# Patient Record
Sex: FEMALE | Race: WHITE | NOT HISPANIC OR LATINO | Employment: FULL TIME | URBAN - METROPOLITAN AREA
[De-identification: names, ages, dates, MRNs, and addresses within clinical notes are randomized per-mention and may not be internally consistent; named-entity substitution may affect disease eponyms.]

---

## 2021-01-01 ENCOUNTER — IMMUNIZATION (OUTPATIENT)
Dept: OBSTETRICS AND GYNECOLOGY | Facility: CLINIC | Age: 46
End: 2021-01-01
Payer: COMMERCIAL

## 2021-01-01 ENCOUNTER — IMMUNIZATION (OUTPATIENT)
Dept: PRIMARY CARE CLINIC | Facility: CLINIC | Age: 46
End: 2021-01-01
Payer: OTHER GOVERNMENT

## 2021-01-01 DIAGNOSIS — Z23 NEED FOR VACCINATION: Primary | ICD-10-CM

## 2021-01-01 PROCEDURE — 91300 COVID-19, MRNA, LNP-S, PF, 30 MCG/0.3 ML DOSE VACCINE: CPT | Mod: S$GLB,,, | Performed by: FAMILY MEDICINE

## 2021-01-01 PROCEDURE — 0002A COVID-19, MRNA, LNP-S, PF, 30 MCG/0.3 ML DOSE VACCINE: ICD-10-PCS | Mod: CV19,S$GLB,, | Performed by: FAMILY MEDICINE

## 2021-01-01 PROCEDURE — 91300 COVID-19, MRNA, LNP-S, PF, 30 MCG/0.3 ML DOSE VACCINE: CPT | Mod: PBBFAC | Performed by: INTERNAL MEDICINE

## 2021-01-01 PROCEDURE — 91300 COVID-19, MRNA, LNP-S, PF, 30 MCG/0.3 ML DOSE VACCINE: ICD-10-PCS | Mod: S$GLB,,, | Performed by: FAMILY MEDICINE

## 2021-01-01 PROCEDURE — 0002A COVID-19, MRNA, LNP-S, PF, 30 MCG/0.3 ML DOSE VACCINE: CPT | Mod: CV19,S$GLB,, | Performed by: FAMILY MEDICINE

## 2021-01-01 PROCEDURE — 0003A COVID-19, MRNA, LNP-S, PF, 30 MCG/0.3 ML DOSE VACCINE: CPT | Mod: CV19,PBBFAC | Performed by: INTERNAL MEDICINE

## 2021-03-23 ENCOUNTER — IMMUNIZATION (OUTPATIENT)
Dept: OBSTETRICS AND GYNECOLOGY | Facility: CLINIC | Age: 46
End: 2021-03-23
Payer: COMMERCIAL

## 2021-03-23 DIAGNOSIS — Z23 NEED FOR VACCINATION: Primary | ICD-10-CM

## 2021-03-23 PROCEDURE — 91300 COVID-19, MRNA, LNP-S, PF, 30 MCG/0.3 ML DOSE VACCINE: CPT | Mod: PBBFAC | Performed by: FAMILY MEDICINE

## 2022-01-01 ENCOUNTER — HOSPITAL ENCOUNTER (INPATIENT)
Facility: HOSPITAL | Age: 47
LOS: 1 days | DRG: 064 | End: 2022-04-09
Attending: EMERGENCY MEDICINE | Admitting: PSYCHIATRY & NEUROLOGY

## 2022-01-01 VITALS
OXYGEN SATURATION: 99 % | TEMPERATURE: 100 F | HEIGHT: 64 IN | WEIGHT: 149.06 LBS | BODY MASS INDEX: 25.45 KG/M2 | SYSTOLIC BLOOD PRESSURE: 161 MMHG | DIASTOLIC BLOOD PRESSURE: 96 MMHG

## 2022-01-01 DIAGNOSIS — D62 ACUTE BLOOD LOSS ANEMIA: ICD-10-CM

## 2022-01-01 DIAGNOSIS — S06.310S: ICD-10-CM

## 2022-01-01 DIAGNOSIS — D69.6 THROMBOCYTOPENIA: ICD-10-CM

## 2022-01-01 DIAGNOSIS — G93.6 CYTOTOXIC CEREBRAL EDEMA: ICD-10-CM

## 2022-01-01 DIAGNOSIS — N92.0 MENORRHAGIA WITH REGULAR CYCLE: ICD-10-CM

## 2022-01-01 DIAGNOSIS — G91.1 OBSTRUCTIVE HYDROCEPHALUS: ICD-10-CM

## 2022-01-01 DIAGNOSIS — S06.33AA INTRAPARENCHYMAL HEMATOMA OF BRAIN: ICD-10-CM

## 2022-01-01 DIAGNOSIS — I62.9 SPONTANEOUS INTRACRANIAL HEMORRHAGE: Primary | ICD-10-CM

## 2022-01-01 DIAGNOSIS — C92.00 ACUTE MYELOID LEUKEMIA NOT HAVING ACHIEVED REMISSION: ICD-10-CM

## 2022-01-01 DIAGNOSIS — S06.310A INTRAPARENCHYMAL HEMATOMA OF BRAIN, RIGHT, WITHOUT LOSS OF CONSCIOUSNESS, INITIAL ENCOUNTER: ICD-10-CM

## 2022-01-01 DIAGNOSIS — I61.1 NONTRAUMATIC CORTICAL HEMORRHAGE OF RIGHT CEREBRAL HEMISPHERE: ICD-10-CM

## 2022-01-01 DIAGNOSIS — R78.81 BACTEREMIA: ICD-10-CM

## 2022-01-01 DIAGNOSIS — E78.1 HYPERTRIGLYCERIDEMIA: ICD-10-CM

## 2022-01-01 DIAGNOSIS — I61.9 ICH (INTRACEREBRAL HEMORRHAGE): ICD-10-CM

## 2022-01-01 DIAGNOSIS — R74.01 TRANSAMINITIS: ICD-10-CM

## 2022-01-01 DIAGNOSIS — I61.5 IVH (INTRAVENTRICULAR HEMORRHAGE): ICD-10-CM

## 2022-01-01 DIAGNOSIS — I61.5 INTRAVENTRICULAR HEMORRHAGE: ICD-10-CM

## 2022-01-01 DIAGNOSIS — D72.829 LEUKOCYTOSIS, UNSPECIFIED TYPE: ICD-10-CM

## 2022-01-01 DIAGNOSIS — Z53.1 BLOOD TRANSFUSION DECLINED BECAUSE PATIENT IS JEHOVAH'S WITNESS: ICD-10-CM

## 2022-01-01 DIAGNOSIS — G93.5 BRAIN HERNIATION: ICD-10-CM

## 2022-01-01 LAB
ABO + RH BLD: NORMAL
ALBUMIN SERPL BCP-MCNC: 3.8 G/DL (ref 3.5–5.2)
ALBUMIN SERPL BCP-MCNC: 3.9 G/DL (ref 3.5–5.2)
ALP SERPL-CCNC: 101 U/L (ref 55–135)
ALP SERPL-CCNC: 112 U/L (ref 55–135)
ALT SERPL W/O P-5'-P-CCNC: 113 U/L (ref 10–44)
ALT SERPL W/O P-5'-P-CCNC: 116 U/L (ref 10–44)
ANION GAP SERPL CALC-SCNC: 14 MMOL/L (ref 8–16)
ANION GAP SERPL CALC-SCNC: 17 MMOL/L (ref 8–16)
ANISOCYTOSIS BLD QL SMEAR: SLIGHT
ANISOCYTOSIS BLD QL SMEAR: SLIGHT
APTT BLDCRRT: 23.9 SEC (ref 21–32)
AST SERPL-CCNC: 79 U/L (ref 10–40)
AST SERPL-CCNC: 99 U/L (ref 10–40)
BACTERIA #/AREA URNS AUTO: ABNORMAL /HPF
BASOPHILS # BLD AUTO: ABNORMAL K/UL (ref 0–0.2)
BASOPHILS NFR BLD: 0 % (ref 0–1.9)
BASOPHILS NFR BLD: 0 % (ref 0–1.9)
BILIRUB SERPL-MCNC: 0.5 MG/DL (ref 0.1–1)
BILIRUB SERPL-MCNC: 0.6 MG/DL (ref 0.1–1)
BILIRUB UR QL STRIP: NEGATIVE
BLD GP AB SCN CELLS X3 SERPL QL: NORMAL
BUN SERPL-MCNC: 8 MG/DL (ref 6–20)
BUN SERPL-MCNC: 9 MG/DL (ref 6–20)
BURR CELLS BLD QL SMEAR: ABNORMAL
CALCIUM SERPL-MCNC: 9.1 MG/DL (ref 8.7–10.5)
CALCIUM SERPL-MCNC: 9.6 MG/DL (ref 8.7–10.5)
CHLORIDE SERPL-SCNC: 104 MMOL/L (ref 95–110)
CHLORIDE SERPL-SCNC: 108 MMOL/L (ref 95–110)
CHOLEST SERPL-MCNC: 173 MG/DL (ref 120–199)
CHOLEST/HDLC SERPL: 4.4 {RATIO} (ref 2–5)
CLARITY UR REFRACT.AUTO: ABNORMAL
CO2 SERPL-SCNC: 20 MMOL/L (ref 23–29)
CO2 SERPL-SCNC: 24 MMOL/L (ref 23–29)
COLOR UR AUTO: ABNORMAL
CREAT SERPL-MCNC: 0.9 MG/DL (ref 0.5–1.4)
CREAT SERPL-MCNC: 1 MG/DL (ref 0.5–1.4)
CTP QC/QA: YES
DIFFERENTIAL METHOD: ABNORMAL
DIFFERENTIAL METHOD: ABNORMAL
EOSINOPHIL # BLD AUTO: ABNORMAL K/UL (ref 0–0.5)
EOSINOPHIL NFR BLD: 0 % (ref 0–8)
EOSINOPHIL NFR BLD: 0 % (ref 0–8)
ERYTHROCYTE [DISTWIDTH] IN BLOOD BY AUTOMATED COUNT: 15.7 % (ref 11.5–14.5)
ERYTHROCYTE [DISTWIDTH] IN BLOOD BY AUTOMATED COUNT: 16.2 % (ref 11.5–14.5)
EST. GFR  (AFRICAN AMERICAN): >60 ML/MIN/1.73 M^2
EST. GFR  (AFRICAN AMERICAN): >60 ML/MIN/1.73 M^2
EST. GFR  (NON AFRICAN AMERICAN): >60 ML/MIN/1.73 M^2
EST. GFR  (NON AFRICAN AMERICAN): >60 ML/MIN/1.73 M^2
GLUCOSE SERPL-MCNC: 105 MG/DL (ref 70–110)
GLUCOSE SERPL-MCNC: 167 MG/DL (ref 70–110)
GLUCOSE UR QL STRIP: NEGATIVE
HCT VFR BLD AUTO: 29.5 % (ref 37–48.5)
HCT VFR BLD AUTO: 30.4 % (ref 37–48.5)
HDLC SERPL-MCNC: 39 MG/DL (ref 40–75)
HDLC SERPL: 22.5 % (ref 20–50)
HGB BLD-MCNC: 9.4 G/DL (ref 12–16)
HGB BLD-MCNC: 9.7 G/DL (ref 12–16)
HGB UR QL STRIP: ABNORMAL
HYALINE CASTS UR QL AUTO: 0 /LPF
IMM GRANULOCYTES # BLD AUTO: ABNORMAL K/UL (ref 0–0.04)
IMM GRANULOCYTES # BLD AUTO: ABNORMAL K/UL (ref 0–0.04)
IMM GRANULOCYTES NFR BLD AUTO: ABNORMAL % (ref 0–0.5)
IMM GRANULOCYTES NFR BLD AUTO: ABNORMAL % (ref 0–0.5)
INR PPP: 1.4 (ref 0.8–1.2)
KETONES UR QL STRIP: ABNORMAL
LACTATE SERPL-SCNC: 1.1 MMOL/L (ref 0.5–2.2)
LDLC SERPL CALC-MCNC: 96.8 MG/DL (ref 63–159)
LEUKOCYTE ESTERASE UR QL STRIP: NEGATIVE
LYMPHOCYTES # BLD AUTO: ABNORMAL K/UL (ref 1–4.8)
LYMPHOCYTES NFR BLD: 1.5 % (ref 18–48)
LYMPHOCYTES NFR BLD: 2 % (ref 18–48)
MAGNESIUM SERPL-MCNC: 2.1 MG/DL (ref 1.6–2.6)
MCH RBC QN AUTO: 30.5 PG (ref 27–31)
MCH RBC QN AUTO: 30.6 PG (ref 27–31)
MCHC RBC AUTO-ENTMCNC: 31.9 G/DL (ref 32–36)
MCHC RBC AUTO-ENTMCNC: 31.9 G/DL (ref 32–36)
MCV RBC AUTO: 96 FL (ref 82–98)
MCV RBC AUTO: 96 FL (ref 82–98)
MICROSCOPIC COMMENT: ABNORMAL
MONOCYTES NFR BLD: 0.5 % (ref 4–15)
MONOCYTES NFR BLD: 0.5 % (ref 4–15)
NEUTROPHILS NFR BLD: 0.5 % (ref 38–73)
NEUTROPHILS NFR BLD: 1 % (ref 38–73)
NEUTS BAND NFR BLD MANUAL: 0.5 %
NITRITE UR QL STRIP: NEGATIVE
NONHDLC SERPL-MCNC: 134 MG/DL
NRBC BLD-RTO: 0 /100 WBC
NRBC BLD-RTO: 0 /100 WBC
OSMOLALITY SERPL: 311 MOSM/KG (ref 275–295)
PH UR STRIP: 6 [PH] (ref 5–8)
PHOSPHATE SERPL-MCNC: 3.9 MG/DL (ref 2.7–4.5)
PLATELET # BLD AUTO: 17 K/UL (ref 150–450)
PLATELET # BLD AUTO: 20 K/UL (ref 150–450)
PLATELET BLD QL SMEAR: ABNORMAL
PLATELET BLD QL SMEAR: ABNORMAL
PMV BLD AUTO: 11.1 FL (ref 9.2–12.9)
PMV BLD AUTO: 12.8 FL (ref 9.2–12.9)
POIKILOCYTOSIS BLD QL SMEAR: SLIGHT
POTASSIUM SERPL-SCNC: 3.1 MMOL/L (ref 3.5–5.1)
POTASSIUM SERPL-SCNC: 4.2 MMOL/L (ref 3.5–5.1)
PROCALCITONIN SERPL IA-MCNC: 0.32 NG/ML
PROT SERPL-MCNC: 6.8 G/DL (ref 6–8.4)
PROT SERPL-MCNC: 6.9 G/DL (ref 6–8.4)
PROT UR QL STRIP: ABNORMAL
PROTHROMBIN TIME: 13.9 SEC (ref 9–12.5)
RBC # BLD AUTO: 3.08 M/UL (ref 4–5.4)
RBC # BLD AUTO: 3.17 M/UL (ref 4–5.4)
RBC #/AREA URNS AUTO: >100 /HPF (ref 0–4)
SARS-COV-2 RDRP RESP QL NAA+PROBE: NEGATIVE
SODIUM SERPL-SCNC: 142 MMOL/L (ref 136–145)
SODIUM SERPL-SCNC: 145 MMOL/L (ref 136–145)
SP GR UR STRIP: 1.02 (ref 1–1.03)
TRIGL SERPL-MCNC: 186 MG/DL (ref 30–150)
TSH SERPL DL<=0.005 MIU/L-ACNC: 2.71 UIU/ML (ref 0.4–4)
URN SPEC COLLECT METH UR: ABNORMAL
WBC # BLD AUTO: 426.7 K/UL (ref 3.9–12.7)
WBC # BLD AUTO: 472.3 K/UL (ref 3.9–12.7)
WBC #/AREA URNS AUTO: 1 /HPF (ref 0–5)
WBC OTHER NFR BLD MANUAL: 97 %
WBC OTHER NFR BLD MANUAL: 97 %

## 2022-01-01 PROCEDURE — 63600175 PHARM REV CODE 636 W HCPCS: Performed by: STUDENT IN AN ORGANIZED HEALTH CARE EDUCATION/TRAINING PROGRAM

## 2022-01-01 PROCEDURE — 27000221 HC OXYGEN, UP TO 24 HOURS

## 2022-01-01 PROCEDURE — 83930 ASSAY OF BLOOD OSMOLALITY: CPT | Performed by: PHYSICIAN ASSISTANT

## 2022-01-01 PROCEDURE — 84100 ASSAY OF PHOSPHORUS: CPT | Performed by: PSYCHIATRY & NEUROLOGY

## 2022-01-01 PROCEDURE — 99291 PR CRITICAL CARE, E/M 30-74 MINUTES: ICD-10-PCS | Mod: ,,, | Performed by: PHYSICIAN ASSISTANT

## 2022-01-01 PROCEDURE — 99900035 HC TECH TIME PER 15 MIN (STAT)

## 2022-01-01 PROCEDURE — U0002 COVID-19 LAB TEST NON-CDC: HCPCS | Performed by: PHYSICIAN ASSISTANT

## 2022-01-01 PROCEDURE — 85007 BL SMEAR W/DIFF WBC COUNT: CPT | Performed by: PHYSICIAN ASSISTANT

## 2022-01-01 PROCEDURE — 99291 CRITICAL CARE FIRST HOUR: CPT | Mod: CS,,, | Performed by: EMERGENCY MEDICINE

## 2022-01-01 PROCEDURE — 93005 ELECTROCARDIOGRAM TRACING: CPT

## 2022-01-01 PROCEDURE — 99291 CRITICAL CARE FIRST HOUR: CPT | Mod: 25

## 2022-01-01 PROCEDURE — 97167 OT EVAL HIGH COMPLEX 60 MIN: CPT

## 2022-01-01 PROCEDURE — 86901 BLOOD TYPING SEROLOGIC RH(D): CPT | Performed by: PHYSICIAN ASSISTANT

## 2022-01-01 PROCEDURE — 93010 ELECTROCARDIOGRAM REPORT: CPT | Mod: ,,, | Performed by: INTERNAL MEDICINE

## 2022-01-01 PROCEDURE — 97112 NEUROMUSCULAR REEDUCATION: CPT

## 2022-01-01 PROCEDURE — 99291 PR CRITICAL CARE, E/M 30-74 MINUTES: ICD-10-PCS | Mod: CS,,, | Performed by: EMERGENCY MEDICINE

## 2022-01-01 PROCEDURE — 99223 PR INITIAL HOSPITAL CARE,LEVL III: ICD-10-PCS | Mod: ,,, | Performed by: NURSE PRACTITIONER

## 2022-01-01 PROCEDURE — 99238 PR HOSPITAL DISCHARGE DAY,<30 MIN: ICD-10-PCS | Mod: ,,, | Performed by: PSYCHIATRY & NEUROLOGY

## 2022-01-01 PROCEDURE — 99223 1ST HOSP IP/OBS HIGH 75: CPT | Mod: ,,, | Performed by: NURSE PRACTITIONER

## 2022-01-01 PROCEDURE — 99291 CRITICAL CARE FIRST HOUR: CPT | Mod: ,,, | Performed by: PHYSICIAN ASSISTANT

## 2022-01-01 PROCEDURE — 83735 ASSAY OF MAGNESIUM: CPT | Performed by: PSYCHIATRY & NEUROLOGY

## 2022-01-01 PROCEDURE — 80053 COMPREHEN METABOLIC PANEL: CPT | Performed by: PSYCHIATRY & NEUROLOGY

## 2022-01-01 PROCEDURE — A4217 STERILE WATER/SALINE, 500 ML: HCPCS | Performed by: PHYSICIAN ASSISTANT

## 2022-01-01 PROCEDURE — 84443 ASSAY THYROID STIM HORMONE: CPT | Performed by: PHYSICIAN ASSISTANT

## 2022-01-01 PROCEDURE — 94761 N-INVAS EAR/PLS OXIMETRY MLT: CPT

## 2022-01-01 PROCEDURE — 99238 HOSP IP/OBS DSCHRG MGMT 30/<: CPT | Mod: ,,, | Performed by: PSYCHIATRY & NEUROLOGY

## 2022-01-01 PROCEDURE — 63600175 PHARM REV CODE 636 W HCPCS: Performed by: PSYCHIATRY & NEUROLOGY

## 2022-01-01 PROCEDURE — 25000003 PHARM REV CODE 250: Performed by: PHYSICIAN ASSISTANT

## 2022-01-01 PROCEDURE — 83036 HEMOGLOBIN GLYCOSYLATED A1C: CPT | Performed by: PHYSICIAN ASSISTANT

## 2022-01-01 PROCEDURE — 85027 COMPLETE CBC AUTOMATED: CPT | Performed by: STUDENT IN AN ORGANIZED HEALTH CARE EDUCATION/TRAINING PROGRAM

## 2022-01-01 PROCEDURE — 80061 LIPID PANEL: CPT | Performed by: PHYSICIAN ASSISTANT

## 2022-01-01 PROCEDURE — 25000003 PHARM REV CODE 250

## 2022-01-01 PROCEDURE — 99285 EMERGENCY DEPT VISIT HI MDM: CPT | Mod: 25

## 2022-01-01 PROCEDURE — 63600175 PHARM REV CODE 636 W HCPCS: Performed by: PHYSICIAN ASSISTANT

## 2022-01-01 PROCEDURE — 94002 VENT MGMT INPAT INIT DAY: CPT

## 2022-01-01 PROCEDURE — 25000003 PHARM REV CODE 250: Performed by: STUDENT IN AN ORGANIZED HEALTH CARE EDUCATION/TRAINING PROGRAM

## 2022-01-01 PROCEDURE — 85027 COMPLETE CBC AUTOMATED: CPT | Performed by: PHYSICIAN ASSISTANT

## 2022-01-01 PROCEDURE — 81001 URINALYSIS AUTO W/SCOPE: CPT | Performed by: PHYSICIAN ASSISTANT

## 2022-01-01 PROCEDURE — 85730 THROMBOPLASTIN TIME PARTIAL: CPT | Performed by: PHYSICIAN ASSISTANT

## 2022-01-01 PROCEDURE — 51702 INSERT TEMP BLADDER CATH: CPT

## 2022-01-01 PROCEDURE — 85060 BLOOD SMEAR INTERPRETATION: CPT | Mod: ,,, | Performed by: PATHOLOGY

## 2022-01-01 PROCEDURE — 99900026 HC AIRWAY MAINTENANCE (STAT)

## 2022-01-01 PROCEDURE — 63600175 PHARM REV CODE 636 W HCPCS: Mod: JG | Performed by: PHYSICIAN ASSISTANT

## 2022-01-01 PROCEDURE — 25000003 PHARM REV CODE 250: Performed by: PSYCHIATRY & NEUROLOGY

## 2022-01-01 PROCEDURE — 83605 ASSAY OF LACTIC ACID: CPT | Performed by: PHYSICIAN ASSISTANT

## 2022-01-01 PROCEDURE — 96374 THER/PROPH/DIAG INJ IV PUSH: CPT

## 2022-01-01 PROCEDURE — 85007 BL SMEAR W/DIFF WBC COUNT: CPT | Performed by: STUDENT IN AN ORGANIZED HEALTH CARE EDUCATION/TRAINING PROGRAM

## 2022-01-01 PROCEDURE — 87040 BLOOD CULTURE FOR BACTERIA: CPT | Mod: 59 | Performed by: PHYSICIAN ASSISTANT

## 2022-01-01 PROCEDURE — 80053 COMPREHEN METABOLIC PANEL: CPT | Performed by: STUDENT IN AN ORGANIZED HEALTH CARE EDUCATION/TRAINING PROGRAM

## 2022-01-01 PROCEDURE — 85610 PROTHROMBIN TIME: CPT | Performed by: PHYSICIAN ASSISTANT

## 2022-01-01 PROCEDURE — 63600175 PHARM REV CODE 636 W HCPCS

## 2022-01-01 PROCEDURE — 36415 COLL VENOUS BLD VENIPUNCTURE: CPT | Performed by: PHYSICIAN ASSISTANT

## 2022-01-01 PROCEDURE — 20000000 HC ICU ROOM

## 2022-01-01 PROCEDURE — 96375 TX/PRO/DX INJ NEW DRUG ADDON: CPT

## 2022-01-01 PROCEDURE — 93010 EKG 12-LEAD: ICD-10-PCS | Mod: ,,, | Performed by: INTERNAL MEDICINE

## 2022-01-01 PROCEDURE — 85060 PATHOLOGIST REVIEW: ICD-10-PCS | Mod: ,,, | Performed by: PATHOLOGY

## 2022-01-01 PROCEDURE — 84145 PROCALCITONIN (PCT): CPT | Performed by: PHYSICIAN ASSISTANT

## 2022-01-01 RX ORDER — 3% SODIUM CHLORIDE 3 G/100ML
60 INJECTION, SOLUTION INTRAVENOUS CONTINUOUS
Status: DISCONTINUED | OUTPATIENT
Start: 2022-01-01 | End: 2022-01-01

## 2022-01-01 RX ORDER — LORAZEPAM 2 MG/ML
1 INJECTION INTRAMUSCULAR EVERY 30 MIN PRN
Status: DISCONTINUED | OUTPATIENT
Start: 2022-01-01 | End: 2022-01-01 | Stop reason: HOSPADM

## 2022-01-01 RX ORDER — SODIUM CHLORIDE 9 MG/ML
INJECTION, SOLUTION INTRAVENOUS CONTINUOUS
Status: DISCONTINUED | OUTPATIENT
Start: 2022-01-01 | End: 2022-01-01

## 2022-01-01 RX ORDER — MORPHINE SULFATE 4 MG/ML
4 INJECTION, SOLUTION INTRAMUSCULAR; INTRAVENOUS
Status: COMPLETED | OUTPATIENT
Start: 2022-01-01 | End: 2022-01-01

## 2022-01-01 RX ORDER — ROCURONIUM BROMIDE 10 MG/ML
INJECTION, SOLUTION INTRAVENOUS
Status: COMPLETED
Start: 2022-01-01 | End: 2022-01-01

## 2022-01-01 RX ORDER — ACETAMINOPHEN 650 MG/1
650 SUPPOSITORY RECTAL EVERY 6 HOURS PRN
Status: DISCONTINUED | OUTPATIENT
Start: 2022-01-01 | End: 2022-01-01

## 2022-01-01 RX ORDER — NICARDIPINE HYDROCHLORIDE 0.2 MG/ML
0-15 INJECTION INTRAVENOUS CONTINUOUS
Status: DISCONTINUED | OUTPATIENT
Start: 2022-01-01 | End: 2022-01-01

## 2022-01-01 RX ORDER — ONDANSETRON 2 MG/ML
8 INJECTION INTRAMUSCULAR; INTRAVENOUS EVERY 8 HOURS PRN
Status: DISCONTINUED | OUTPATIENT
Start: 2022-01-01 | End: 2022-01-01 | Stop reason: HOSPADM

## 2022-01-01 RX ORDER — MORPHINE SULFATE IN 0.9 % NACL 30 MG/30ML
0-10 PATIENT CONTROLLED ANALGESIA SYRINGE INTRAVENOUS CONTINUOUS
Status: DISCONTINUED | OUTPATIENT
Start: 2022-01-01 | End: 2022-01-01 | Stop reason: HOSPADM

## 2022-01-01 RX ORDER — LEVETIRACETAM 500 MG/5ML
1000 INJECTION, SOLUTION, CONCENTRATE INTRAVENOUS ONCE
Status: COMPLETED | OUTPATIENT
Start: 2022-01-01 | End: 2022-01-01

## 2022-01-01 RX ORDER — PROPOFOL 10 MG/ML
VIAL (ML) INTRAVENOUS
Status: COMPLETED
Start: 2022-01-01 | End: 2022-01-01

## 2022-01-01 RX ORDER — ONDANSETRON 2 MG/ML
INJECTION INTRAMUSCULAR; INTRAVENOUS
Status: COMPLETED
Start: 2022-01-01 | End: 2022-01-01

## 2022-01-01 RX ORDER — ETOMIDATE 2 MG/ML
INJECTION INTRAVENOUS
Status: COMPLETED
Start: 2022-01-01 | End: 2022-01-01

## 2022-01-01 RX ORDER — ONDANSETRON 2 MG/ML
4 INJECTION INTRAMUSCULAR; INTRAVENOUS EVERY 8 HOURS PRN
Status: DISCONTINUED | OUTPATIENT
Start: 2022-01-01 | End: 2022-01-01

## 2022-01-01 RX ORDER — SODIUM CHLORIDE 0.9 % (FLUSH) 0.9 %
10 SYRINGE (ML) INJECTION
Status: DISCONTINUED | OUTPATIENT
Start: 2022-01-01 | End: 2022-01-01 | Stop reason: HOSPADM

## 2022-01-01 RX ORDER — PROPOFOL 10 MG/ML
50 VIAL (ML) INTRAVENOUS ONCE
Status: COMPLETED | OUTPATIENT
Start: 2022-01-01 | End: 2022-01-01

## 2022-01-01 RX ORDER — LEVETIRACETAM 500 MG/5ML
1000 INJECTION, SOLUTION, CONCENTRATE INTRAVENOUS
Status: COMPLETED | OUTPATIENT
Start: 2022-01-01 | End: 2022-01-01

## 2022-01-01 RX ORDER — ACETAMINOPHEN 500 MG
1000 TABLET ORAL
Status: DISCONTINUED | OUTPATIENT
Start: 2022-01-01 | End: 2022-01-01

## 2022-01-01 RX ORDER — PROPOFOL 10 MG/ML
0-50 INJECTION, EMULSION INTRAVENOUS CONTINUOUS
Status: DISCONTINUED | OUTPATIENT
Start: 2022-01-01 | End: 2022-01-01

## 2022-01-01 RX ORDER — MANNITOL 20 G/100ML
50 INJECTION, SOLUTION INTRAVENOUS ONCE
Status: COMPLETED | OUTPATIENT
Start: 2022-01-01 | End: 2022-01-01

## 2022-01-01 RX ORDER — LEVETIRACETAM 500 MG/5ML
500 INJECTION, SOLUTION, CONCENTRATE INTRAVENOUS EVERY 12 HOURS
Status: DISCONTINUED | OUTPATIENT
Start: 2022-01-01 | End: 2022-01-01

## 2022-01-01 RX ORDER — LEVETIRACETAM 500 MG/5ML
500 INJECTION, SOLUTION, CONCENTRATE INTRAVENOUS
Status: DISCONTINUED | OUTPATIENT
Start: 2022-01-01 | End: 2022-01-01

## 2022-01-01 RX ORDER — ROCURONIUM BROMIDE 10 MG/ML
100 INJECTION, SOLUTION INTRAVENOUS ONCE
Status: COMPLETED | OUTPATIENT
Start: 2022-01-01 | End: 2022-01-01

## 2022-01-01 RX ADMIN — DESMOPRESSIN ACETATE 17.7 MCG: 4 INJECTION INTRAVENOUS at 10:04

## 2022-01-01 RX ADMIN — PROPOFOL 5 MCG/KG/MIN: 10 INJECTION, EMULSION INTRAVENOUS at 07:04

## 2022-01-01 RX ADMIN — ROCURONIUM BROMIDE 100 MG: 10 INJECTION, SOLUTION INTRAVENOUS at 12:04

## 2022-01-01 RX ADMIN — Medication 2 MG/HR: at 08:04

## 2022-01-01 RX ADMIN — ACETAMINOPHEN 650 MG: 650 SUPPOSITORY RECTAL at 09:04

## 2022-01-01 RX ADMIN — SODIUM CHLORIDE: 2.5 INJECTION, SOLUTION, CONCENTRATE INTRAVENOUS at 11:04

## 2022-01-01 RX ADMIN — LEVETIRACETAM 1000 MG: 100 INJECTION, SOLUTION, CONCENTRATE INTRAVENOUS at 09:04

## 2022-01-01 RX ADMIN — PROPOFOL 50 MG: 10 INJECTION, EMULSION INTRAVENOUS at 12:04

## 2022-01-01 RX ADMIN — ONDANSETRON 4 MG: 2 INJECTION INTRAMUSCULAR; INTRAVENOUS at 09:04

## 2022-01-01 RX ADMIN — GLYCOPYRROLATE 0.2 MG: 0.2 INJECTION, SOLUTION INTRAMUSCULAR; INTRAVITREAL at 08:04

## 2022-01-01 RX ADMIN — Medication 50 MG: at 12:04

## 2022-01-01 RX ADMIN — SODIUM CHLORIDE 30 ML/HR: 3 INJECTION, SOLUTION INTRAVENOUS at 04:04

## 2022-01-01 RX ADMIN — MANNITOL 50 G: 20 INJECTION, SOLUTION INTRAVENOUS at 03:04

## 2022-01-01 RX ADMIN — LEVETIRACETAM 1000 MG: 100 INJECTION, SOLUTION, CONCENTRATE INTRAVENOUS at 08:04

## 2022-01-01 RX ADMIN — SODIUM CHLORIDE: 2.5 INJECTION, SOLUTION, CONCENTRATE INTRAVENOUS at 02:04

## 2022-01-01 RX ADMIN — LORAZEPAM 1 MG: 2 INJECTION INTRAMUSCULAR; INTRAVENOUS at 09:04

## 2022-01-01 RX ADMIN — SODIUM CHLORIDE: 2.5 INJECTION, SOLUTION, CONCENTRATE INTRAVENOUS at 12:04

## 2022-01-01 RX ADMIN — SODIUM CHLORIDE: 0.9 INJECTION, SOLUTION INTRAVENOUS at 10:04

## 2022-01-01 RX ADMIN — MORPHINE SULFATE 4 MG: 4 INJECTION INTRAVENOUS at 08:04

## 2022-01-01 RX ADMIN — VANCOMYCIN HYDROCHLORIDE 1500 MG: 1.5 INJECTION, POWDER, LYOPHILIZED, FOR SOLUTION INTRAVENOUS at 04:04

## 2022-04-08 PROBLEM — G93.6 CYTOTOXIC CEREBRAL EDEMA: Status: ACTIVE | Noted: 2022-01-01

## 2022-04-08 PROBLEM — S06.33AA INTRAPARENCHYMAL HEMATOMA OF BRAIN: Status: ACTIVE | Noted: 2022-01-01

## 2022-04-08 PROBLEM — C95.90 LEUKEMIA: Status: ACTIVE | Noted: 2022-01-01

## 2022-04-09 PROBLEM — N92.0 MENORRHAGIA: Status: ACTIVE | Noted: 2022-01-01

## 2022-04-09 PROBLEM — D72.829 LEUKOCYTOSIS: Status: ACTIVE | Noted: 2022-01-01

## 2022-04-09 PROBLEM — C92.00 AML (ACUTE MYELOBLASTIC LEUKEMIA): Status: ACTIVE | Noted: 2022-01-01

## 2022-04-09 PROBLEM — R78.81 BACTEREMIA: Status: ACTIVE | Noted: 2022-01-01

## 2022-04-09 PROBLEM — G93.5 BRAIN HERNIATION: Status: ACTIVE | Noted: 2022-01-01

## 2022-04-09 PROBLEM — E78.1 HYPERTRIGLYCERIDEMIA: Status: ACTIVE | Noted: 2022-01-01

## 2022-04-09 PROBLEM — Z53.1 BLOOD TRANSFUSION DECLINED BECAUSE PATIENT IS JEHOVAH'S WITNESS: Status: ACTIVE | Noted: 2022-01-01

## 2022-04-09 PROBLEM — R74.01 TRANSAMINITIS: Status: ACTIVE | Noted: 2022-01-01

## 2022-04-09 PROBLEM — D62 ACUTE BLOOD LOSS ANEMIA: Status: ACTIVE | Noted: 2022-01-01

## 2022-04-09 PROBLEM — I61.5 IVH (INTRAVENTRICULAR HEMORRHAGE): Status: ACTIVE | Noted: 2022-01-01

## 2022-04-09 PROBLEM — D69.6 THROMBOCYTOPENIA: Status: ACTIVE | Noted: 2022-01-01

## 2022-04-09 PROBLEM — G91.1 OBSTRUCTIVE HYDROCEPHALUS: Status: ACTIVE | Noted: 2022-01-01

## 2022-04-09 NOTE — PLAN OF CARE
Psychiatric Care Plan    POC reviewed with Lori Hawkins and family at 0300. Pt verbalized understanding. Questions and concerns addressed. No acute events overnight. Pt progressing toward goals. Will continue to monitor. See below and flowsheets for full assessment and VS info.   -neuro exam monitored and addressed with provider as needed  - BP remained WDL  - CTH completed  - 3% solution started   - two bolus of 2% solutions given   - mannitol given   - blood cultures obtained  -urine cultures collected    Neuro:  [place] Coma Scale  Best Eye Response: 1-->(E1) none  Best Motor Response: 2-->(M2) extension to pain  Best Verbal Response: 1-->(V1) none  [place] Coma Scale Score: 4  Assessment Qualifiers: no eye obstruction present, patient intubated        24hr Temp:  [96.8 °F (36 °C)-101.5 °F (38.6 °C)]     CV:   Rhythm: normal sinus rhythm  BP goals:   SBP < 140  MAP > 65    Resp:   O2 Device (Oxygen Therapy): ventilator  Oxygen Concentration (%): 70    Plan: wean to extubate    GI/:     Diet/Nutrition Received: NPO  Last Bowel Movement: 04/08/22  Voiding Characteristics: external catheter    Intake/Output Summary (Last 24 hours) at 4/9/2022 0727  Last data filed at 4/9/2022 0605  Gross per 24 hour   Intake 410.32 ml   Output 320 ml   Net 90.32 ml          Labs/Accuchecks:  Recent Labs   Lab 04/09/22  0219   .70*   RBC 3.08*   HGB 9.4*   HCT 29.5*   PLT 17*      Recent Labs   Lab 04/09/22  0429      K 3.1*   CO2 20*      BUN 9   CREATININE 0.9   ALKPHOS 101   *   AST 79*   BILITOT 0.6      Recent Labs   Lab 04/08/22  2321   INR 1.4*   APTT 23.9    No results for input(s): CPK, CPKMB, TROPONINI, MB in the last 168 hours.    Electrolytes: No replacement orders  Accuchecks: none    Gtts:   morphine      niCARdipine Stopped (04/08/22 2300)    propofoL Stopped (04/09/22 0100)    sodium chloride 3% 60 mL/hr (04/09/22 0517)       LDA/Wounds:  Lines/Drains/Airways       Drain  Duration                   Urethral Catheter 04/09/22 0300 <1 day              Airway  Duration                  Airway - Non-Surgical 04/08/22 2347 Endotracheal Tube <1 day              Peripheral Intravenous Line  Duration                  Peripheral IV - Single Lumen 04/08/22 1944 18 G Left Antecubital <1 day         Peripheral IV - Single Lumen 04/08/22 2006 18 G Right Forearm <1 day                  Wounds: Yes  Wound care consulted: No   Problem: Adjustment to Illness (Stroke, Hemorrhagic)  Goal: Optimal Coping  Outcome: Ongoing, Progressing  Intervention: Support Psychosocial Response to Stroke  Flowsheets (Taken 4/9/2022 4109)  Supportive Measures: active listening utilized  Family/Support System Care: caregiver stress acknowledged

## 2022-04-09 NOTE — NURSING
MAXIMO notified of GCS of 4 and possible switch to comfort care later today. Referal number [phone]. Whitley Cotton was coordinator. Pt not a candidate for tissue donation due to history

## 2022-04-09 NOTE — SUBJECTIVE & OBJECTIVE
History reviewed. No pertinent past medical history.  Past Surgical History:   Procedure Laterality Date    excision lip cyst      benign     Family History   Problem Relation Age of Onset    Breast cancer Neg Hx     Colon cancer Neg Hx     Ovarian cancer Neg Hx      Social History     Tobacco Use    Smoking status: Never Smoker    Smokeless tobacco: Never Used     Review of patient's allergies indicates:   Allergen Reactions    Pcn [penicillins]        Medications: I have reviewed the current medication administration record.    Medications Prior to Admission   Medication Sig Dispense Refill Last Dose    levonorgestrel-ethinyl estradiol (ENPRESSE) 50-30 (6)/75-40 (5)/125-30(10) per tablet Take 1 tablet by mouth once daily. 84 tablet 4        Review of Systems   Unable to perform ROS: Mental status change   Constitutional:  Positive for fever.   HENT:  Negative for drooling.    Eyes:  Negative for photophobia and redness.   Respiratory:  Negative for cough and shortness of breath.    Gastrointestinal:  Positive for vomiting. Negative for diarrhea.   Genitourinary:  Positive for hematuria (patient currently on menstrual cycle).   Musculoskeletal:  Positive for neck pain.   Skin:  Positive for color change.   Neurological:  Positive for dizziness and headaches.   Psychiatric/Behavioral:  Negative for agitation.    Objective:     Vital Signs (Most Recent):  Temp: 96.8 °F (36 °C) (04/08/22 2234)  Pulse: 82 (04/08/22 2217)  Resp: 16 (04/08/22 2012)  BP: 112/71 (04/08/22 2216)  SpO2: 96 % (04/08/22 2217)    Vital Signs Range (Last 24H):  Temp:  [96.8 °F (36 °C)-101.5 °F (38.6 °C)]   Pulse:  [82-96]   Resp:  [16]   BP: (112-133)/(71-77)   SpO2:  [94 %-97 %]     Physical Exam  Vitals and nursing note reviewed.   Constitutional:       Appearance: She is well-developed, well-groomed and normal weight.   HENT:      Head: Normocephalic and atraumatic.      Right Ear: External ear normal.      Left Ear: External ear normal.       Nose: Nose normal.      Mouth/Throat:      Mouth: Mucous membranes are moist.   Eyes:      General: No scleral icterus.        Right eye: No discharge.         Left eye: No discharge.      Extraocular Movements: Extraocular movements intact.      Conjunctiva/sclera: Conjunctivae normal.      Pupils: Pupils are equal, round, and reactive to light.   Cardiovascular:      Rate and Rhythm: Normal rate and regular rhythm.   Pulmonary:      Effort: Pulmonary effort is normal. No respiratory distress.   Abdominal:      General: Abdomen is flat. There is no distension.      Palpations: Abdomen is soft.   Musculoskeletal:      Cervical back: Normal range of motion and neck supple.      Right lower leg: No edema.      Left lower leg: No edema.   Skin:     General: Skin is warm and dry.      Findings: Bruising present.   Neurological:      Mental Status: She is lethargic.      GCS: GCS eye subscore is 2. GCS verbal subscore is 2. GCS motor subscore is 6.      Motor: No tremor.       Neurological Exam:   LOC: obtunded  Attention Span: Good   Pupils (CN II, III): PERRL      Laboratory:  CMP:   Recent Labs   Lab 04/08/22 2013   CALCIUM 9.6   ALBUMIN 3.9   PROT 6.8      K 4.2   CO2 24      BUN 8   CREATININE 1.0   ALKPHOS 112   *   AST 99*   BILITOT 0.5     CBC:   Recent Labs   Lab 04/08/22 2013   .30*   RBC 3.17*   HGB 9.7*   HCT 30.4*   PLT 20*   MCV 96   MCH 30.6   MCHC 31.9*     Lipid Panel: No results for input(s): CHOL, LDLCALC, HDL, TRIG in the last 168 hours.  Coagulation: No results for input(s): PT, INR, APTT in the last 168 hours.  Hgb A1C: No results for input(s): HGBA1C in the last 168 hours.  TSH: No results for input(s): TSH in the last 168 hours.    Diagnostic Results:      Brain imaging:  CT 4/8/2022 Impression: Large acute intraparenchymal hematoma in the right cerebrum at the frontoparietal junction with right lateral intraventricular extension.  Small intraparenchymal hematoma  in the anterior right temporal lobe also.  Possible minimal subarachnoid hemorrhage in the basilar cisterns also.  Mild mass effect on the right with mild compression of the right lateral ventricle and mild midline shift to the left.  Neurosurgical consultation and follow-up recommended.      Vessel Imaging:  None    Cardiac Evaluation:   TTE pending

## 2022-04-09 NOTE — SUBJECTIVE & OBJECTIVE
History reviewed. No pertinent past medical history.  Past Surgical History:   Procedure Laterality Date    excision lip cyst      benign      No current facility-administered medications on file prior to encounter.     Current Outpatient Medications on File Prior to Encounter   Medication Sig Dispense Refill    levonorgestrel-ethinyl estradiol (ENPRESSE) 50-30 (6)/75-40 (5)/125-30(10) per tablet Take 1 tablet by mouth once daily. 84 tablet 4      Allergies: Pcn [penicillins]    Family History   Problem Relation Age of Onset    Breast cancer Neg Hx     Colon cancer Neg Hx     Ovarian cancer Neg Hx      Social History     Tobacco Use    Smoking status: Never Smoker    Smokeless tobacco: Never Used     Pt had just received 4mg morphine and was drowsy.  Review of Systems   Unable to perform ROS: Acuity of condition   Constitutional:  Positive for fever.   HENT:  Negative for rhinorrhea.         Bleeding gums   Respiratory:  Negative for shortness of breath and wheezing.    Cardiovascular:  Negative for chest pain and palpitations.   Gastrointestinal:  Positive for nausea and vomiting. Negative for abdominal pain, constipation and diarrhea.   Genitourinary:         Currently menstruating with menorrhagia   Musculoskeletal:  Negative for neck stiffness.   Skin:         Petechial hemorrhages and bruising throughout BLE and R hand   Neurological:  Positive for weakness, numbness and headaches. Negative for dizziness, tremors, seizures and speech difficulty.   Hematological:  Bruises/bleeds easily.   Psychiatric/Behavioral:  Negative for agitation and confusion.    Objective:     Vitals:    Temp: 98.2 °F (36.8 °C)  Pulse: 71  Rhythm: normal sinus rhythm  BP: 123/78  MAP (mmHg): 96  Resp: 19  SpO2: 99 %  Oxygen Concentration (%): 70  O2 Device (Oxygen Therapy): ventilator    Temp  Min: 96.8 °F (36 °C)  Max: 101.5 °F (38.6 °C)  Pulse  Min: 71  Max: 99  BP  Min: 112/71  Max: 133/72  MAP (mmHg)  Min: 87  Max: 99  Resp  Min: 14   Max: 21  SpO2  Min: 92 %  Max: 99 %  Oxygen Concentration (%)  Min: 70  Max: 100    04/08 0701 - 04/09 0700  In: 410.3 [I.V.:360.3]  Out: -            Physical Exam  Constitutional:       Appearance: She is ill-appearing.      Comments: Drowsy as just received 4mg morphine but alerts to light touch and voice   HENT:      Head: Normocephalic and atraumatic.      Right Ear: External ear normal.      Left Ear: External ear normal.      Nose: Nose normal. No rhinorrhea.      Mouth/Throat:      Mouth: Mucous membranes are moist.      Comments: Bleeding gums  Eyes:      General:         Right eye: No discharge.         Left eye: No discharge.      Conjunctiva/sclera: Conjunctivae normal.   Cardiovascular:      Rate and Rhythm: Normal rate and regular rhythm.      Pulses: Normal pulses.   Pulmonary:      Effort: Pulmonary effort is normal. No respiratory distress.      Breath sounds: No stridor. No wheezing.   Abdominal:      General: Abdomen is flat. There is no distension.      Palpations: Abdomen is soft. There is no mass.      Tenderness: There is no abdominal tenderness. There is no guarding.      Hernia: No hernia is present.   Musculoskeletal:         General: No swelling, tenderness or deformity.      Cervical back: Neck supple. No rigidity.   Skin:     General: Skin is warm and dry.      Findings: Bruising (BLE) present.      Comments: Petechial hemorrhaging in BLE and R hand/nails   Neurological:      Comments: Initial exam, no sedation but just received 4mg morphine:  GCS [id number]  Drowsy but alerts to light touch/voice  Oriented x 4  Follows commands on R, plegic on L  PERRL 3mm  R gaze preference overcome with L eye but not R  Minor facial asymmetry  Moves R side spont and to command AG with 5/5 strength  L side plegic  Sensation intact to light touch RUE, diminished LUE, LLE, RLE   LUE no movement to pain, LLE minimal to no movement to pain    Psychiatric:         Mood and Affect: Mood normal.     Unable to  test memory, judgment, insight, fund of knowledge, coordination, gait due to level of consciousness.    Today I personally reviewed pertinent medications, lines/drains/airways, imaging, cardiology results, laboratory results, microbiology results, notably:    CTH

## 2022-04-09 NOTE — NURSING
Pt transported to CT with cardiac monitoring. Rn assist X2 was used with RT presence for ventilator support. No acute events during transport. Pt returned to room. RN WCTM

## 2022-04-09 NOTE — SUBJECTIVE & OBJECTIVE
(Not in a hospital admission)      Review of patient's allergies indicates:   Allergen Reactions    Pcn [penicillins]        History reviewed. No pertinent past medical history.  Past Surgical History:   Procedure Laterality Date    excision lip cyst      benign     Family History    None       Tobacco Use    Smoking status: Never Smoker    Smokeless tobacco: Never Used   Substance and Sexual Activity    Alcohol use: Not on file    Drug use: Not on file    Sexual activity: Not on file     Review of Systems  Objective:     Weight: 59 kg (130 lb)  Body mass index is 22.31 kg/m².  Vital Signs (Most Recent):  Temp: (!) 100.9 °F (38.3 °C) (04/08/22 1922)  Pulse: 96 (04/08/22 2012)  Resp: 16 (04/08/22 2012)  BP: 125/74 (04/08/22 2002)  SpO2: 97 % (04/08/22 2012)   Vital Signs (24h Range):  Temp:  [100.9 °F (38.3 °C)] 100.9 °F (38.3 °C)  Pulse:  [92-96] 96  Resp:  [16] 16  SpO2:  [96 %-97 %] 97 %  BP: (121-133)/(72-76) 125/74                   Physical Exam  Neurosurgery Physical Exam    Left alfredo plegic   follows on right. Aox2   PERRL    right gaze deviation.  Sees two fingers in left quadrant.       Significant Labs:  Recent Labs   Lab 04/08/22 2013         K 4.2      CO2 24   BUN 8   CREATININE 1.0   CALCIUM 9.6     Recent Labs   Lab 04/08/22 2013   .30*   HGB 9.7*   HCT 30.4*   PLT 20*     No results for input(s): LABPT, INR, APTT in the last 48 hours.  Microbiology Results (last 7 days)       ** No results found for the last 168 hours. **          All pertinent labs from the last 24 hours have been reviewed.    Significant Diagnostics:  CT: No results found in the last 24 hours.  CTH with IPH near motor strip and IVH but no hydro.  No significant MLS

## 2022-04-09 NOTE — PLAN OF CARE
OT evaluation initiated.  Problem: Occupational Therapy  Goal: Occupational Therapy Goal  Description: Goals set 4/9 to be addressed for 14 days with expiration date, 4/23:  Patient will increase functional independence with ADLs by performing:    Patient will demonstrate rolling to the right with max assist.  Not met   Patient will demonstrate rolling to the left with max assist.   Not met  Patient will demonstrate supine -sit with max  assist.   Not met  Patient will demonstrate stand pivot transfers with max assist.   Not met  Patient will demonstrate grooming while seated with max assist.   Not met  Patient will demonstrate upper body dressing with max assist while seated EOB.   Not met  Patient will demonstrate lower body dressing with max assist while seated EOB.   Not met  Patient will demonstrate toileting with max assist.   Not met  Patient will demonstrate bathing while seated EOB with max assist.   Not met  Patient will demonstrate ability to follow 3/5 commands.   Not met  Patient's family / caregiver will demonstrate independence and safety with assisting patient with self-care skills and functional mobility.     Not met  Patient's family / caregiver will demonstrate independence with providing ROM and changes in bed positioning.   Not met          Outcome: Ongoing, Progressing

## 2022-04-09 NOTE — HPI
46-year-old female with no significant past medical history presents for spontaneous intracranial hemorrhage discovered at  earlier today.  Patient was also found  with a severe leukocytosis, platelets of 19  during their ED visit, and there was concern for new diagnosis of leukemia.  During their hospital stay, patient had a sudden onset of left-sided weakness and was found to intracranial hemorrhage. MRI showed 3.7 x 2.4 x 2.6 cm acute right parietal deep white matter/corona radiata hematoma with no significant associated edema or mass effect. Dependent aspect right lateral ventricle intraventricular hemorrhage.  No hydrocephalus or significant midline shift. Patient is a [employer] and refuses any blood products per [employer]ec[employer]alism belief. Patient and  would like to explore alternative methods for treatment that do not involve transfusions.

## 2022-04-09 NOTE — H&P
"[facility]  Neurocritical Care  History & Physical    Admit Date: 4/8/2022  Service Date: 04/09/2022  Length of Stay: 1    Subjective:     Chief Complaint: Intraparenchymal hematoma of brain    History of Present Illness: 46 y.o. female with no significant past medical history presents with spontaneous R parietal ICH with IVH, and AML. The patient was seen at [facility] earlier today for evaluation of multiple areas of bruising and HA for 1 week. Per , pt was in her usual state of health when two days ago (4/6) she noted petechial hemorrhaging in the BLE and R hand with increased bruising in her BLE. She is an otherwise healthy individual, described as somewhat of a "germaphobe" and eats organically. 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 Her only medication is Enpresse. During her evaluation at OSH, she was noted to have WBC >400,000, platelets of 19k. She was diagnosed with AML. While at OSH, pt experience sudden onset LSW and R gaze. Due to concern for CVA, CTA and MRI were performed without AVM or aneurysm abnormality, with 3.7 x 2.4 x 2.6 cm acute right parietal deep white matter/corona radiata hematoma with no significant associated edema or mass effect. Dependent aspect right lateral ventricle intraventricular hemorrhage.  No hydrocephalus or significant midline shift.  The patient is a practicing Pentecostal and declines whole blood products per Episcopalian beliefs.  Hospice was offered at OSH.  The patient presented to [facility] for exploration of alternative methods for treatment that do not involve transfusions. Interval CTH at [facility] ED showed bleed expansion: Large acute intra parenchymal hematoma in the right frontoparietal cerebrum with associated intraventricular extension of hemorrhage in the right lateral ventricle.  The hematoma measures approximately 6.3 x 7.1 cm on axial 20. There is a small 1.2 cm acute hyperdense hematoma in the anterior right temporal " lobe on axial 14 also.  Slight hyperdensity along the sylvian cisterns bilaterally could be associated with mild subarachnoid hemorrhage.  Bilateral hyperdense MCA sign would be less likely. 7 mm midline shift to the left.  No hydrocephalus. Due to low platelet count and inability to receive platelets, no acute neurosurgical intervention was offered. Pt admitted to [facility] for monitoring and medical management of acute spontaneous R parietal ICH/IVH and AML.             History reviewed. No pertinent past medical history.  Past Surgical History:   Procedure Laterality Date    excision lip cyst      benign      No current facility-administered medications on file prior to encounter.     Current Outpatient Medications on File Prior to Encounter   Medication Sig Dispense Refill    levonorgestrel-ethinyl estradiol (ENPRESSE) 50-30 (6)/75-40 (5)/125-30(10) per tablet Take 1 tablet by mouth once daily. 84 tablet 4      Allergies: Pcn [penicillins]    Family History   Problem Relation Age of Onset    Breast cancer Neg Hx     Colon cancer Neg Hx     Ovarian cancer Neg Hx      Social History     Tobacco Use    Smoking status: Never Smoker    Smokeless tobacco: Never Used     Pt had just received 4mg morphine and was drowsy.  Review of Systems   Unable to perform ROS: Acuity of condition   Constitutional:  Positive for fever.   HENT:  Negative for rhinorrhea.         Bleeding gums   Respiratory:  Negative for shortness of breath and wheezing.    Cardiovascular:  Negative for chest pain and palpitations.   Gastrointestinal:  Positive for nausea and vomiting. Negative for abdominal pain, constipation and diarrhea.   Genitourinary:         Currently menstruating with menorrhagia   Musculoskeletal:  Negative for neck stiffness.   Skin:         Petechial hemorrhages and bruising throughout BLE and R hand   Neurological:  Positive for weakness, numbness and headaches. Negative for dizziness, tremors, seizures and speech  difficulty.   Hematological:  Bruises/bleeds easily.   Psychiatric/Behavioral:  Negative for agitation and confusion.    Objective:     Vitals:    Temp: 98.2 °F (36.8 °C)  Pulse: 71  Rhythm: normal sinus rhythm  BP: 123/78  MAP (mmHg): 96  Resp: 19  SpO2: 99 %  Oxygen Concentration (%): 70  O2 Device (Oxygen Therapy): ventilator    Temp  Min: 96.8 °F (36 °C)  Max: 101.5 °F (38.6 °C)  Pulse  Min: 71  Max: 99  BP  Min: 112/71  Max: 133/72  MAP (mmHg)  Min: 87  Max: 99  Resp  Min: 14  Max: 21  SpO2  Min: 92 %  Max: 99 %  Oxygen Concentration (%)  Min: 70  Max: 100    04/08 0701 - 04/09 0700  In: 410.3 [I.V.:360.3]  Out: -            Physical Exam  Constitutional:       Appearance: She is ill-appearing.      Comments: Drowsy as just received 4mg morphine but alerts to light touch and voice   HENT:      Head: Normocephalic and atraumatic.      Right Ear: External ear normal.      Left Ear: External ear normal.      Nose: Nose normal. No rhinorrhea.      Mouth/Throat:      Mouth: Mucous membranes are moist.      Comments: Bleeding gums  Eyes:      General:         Right eye: No discharge.         Left eye: No discharge.      Conjunctiva/sclera: Conjunctivae normal.   Cardiovascular:      Rate and Rhythm: Normal rate and regular rhythm.      Pulses: Normal pulses.   Pulmonary:      Effort: Pulmonary effort is normal. No respiratory distress.      Breath sounds: No stridor. No wheezing.   Abdominal:      General: Abdomen is flat. There is no distension.      Palpations: Abdomen is soft. There is no mass.      Tenderness: There is no abdominal tenderness. There is no guarding.      Hernia: No hernia is present.   Musculoskeletal:         General: No swelling, tenderness or deformity.      Cervical back: Neck supple. No rigidity.   Skin:     General: Skin is warm and dry.      Findings: Bruising (BLE) present.      Comments: Petechial hemorrhaging in BLE and R hand/nails   Neurological:      Comments: Initial exam, no  sedation but just received 4mg morphine:  GCS [id number]  Drowsy but alerts to light touch/voice  Oriented x 4  Follows commands on R, plegic on L  PERRL 3mm  R gaze preference overcome with L eye but not R  Minor facial asymmetry  Moves R side spont and to command AG with 5/5 strength  L side plegic  Sensation intact to light touch RUE, diminished LUE, LLE, RLE   LUE no movement to pain, LLE minimal to no movement to pain    Psychiatric:         Mood and Affect: Mood normal.     Unable to test memory, judgment, insight, fund of knowledge, coordination, gait due to level of consciousness.    Today I personally reviewed pertinent medications, lines/drains/airways, imaging, cardiology results, laboratory results, microbiology results, notably:    CTH        Assessment/Plan:     Neuro  * Intraparenchymal hematoma of brain  Pt went to OSH today for w/u 2 day hx petechial hemorrhage and bruising on BLE and R hand; while there she developed acute LSW and R gaze, GCS still 15    -CTA/MRI performed showing: without AVM or aneurysm abnormality, with 3.7 x 2.4 x 2.6 cm acute right parietal deep white matter/corona radiata hematoma with no significant associated edema or mass effect. Dependent aspect right lateral ventricle intraventricular hemorrhage.  No hydrocephalus or significant midline shift.   -NSGY consult, no acute intervention given plt 20k and inability to transfuse  -VN consult  -I discussed with  the extent of medical management possible such as HTS, mannitol, DDAVP but stated she was very ill and without platelets cannot receive surgical intervention. I stated we can pursue full medical management but I cannot guarantee that it will improve her state and she will likely progress if we cannot slow down her bleeding and subsequent edema from the insult. Pt  wishing to pursue full care, bringing up medications he researched and asking about calling hematology for treatment for platelets in the night time.  I explained that ED consulted hematology but that if she were deemed a candidate for chemotherapy, that would not begin until the morning when they come see her. In the meantime, we will give DDAVP and hydrate her. At this time, I believe patient's  is unable to process how ill she is. I will continue to discuss with him throughout he night. He does voice understanding that she has a head bleed and is very sick but is fixated on why her platelets are low and what caused AML.  - Interval CTH at [facility] ED showed bleed expansion: Large acute intra parenchymal hematoma in the right frontoparietal cerebrum with associated intraventricular extension of hemorrhage in the right lateral ventricle.  The hematoma measures approximately 6.3 x 7.1 cm on axial 20. There is a small 1.2 cm acute hyperdense hematoma in the anterior right temporal lobe on axial 14 also. Slight hyperdensity along the sylvian cisterns bilaterally could be associated with mild subarachnoid hemorrhage. Bilateral hyperdense MCA sign would be less likely. 7 mm midline shift to the left.  No hydrocephalus.   -DDAVP 0.3mcg/kg given  -pt placed on 2% beginning with a 200cc bolus 2%  -q6h Na  -interval CTH pending  -SBP <140, cardene prn but hasn't needed it  -q1h neuro and VS checks  -no AC/AP given bleed and low plts  -PT/OT/SLP as appropriate    Brain herniation  See IPH    Obstructive hydrocephalus  See IPH    IVH (intraventricular hemorrhage)  See IPH    Cytotoxic cerebral edema  See IPH      Cardiac/Vascular  Hypertriglyceridemia  Noted, holding statin for now given transaminitis    Renal/  Menorrhagia  Pt menstruating with much heavier flow than usual, likely due to thrombocytopenia/anemia    ID  Bacteremia  procal elevated, OSH called  to tell him both blood cultured + GPCs  -vanc begun, repeat blood cultures pending  -UA with reflex pending as well, many bacteria noted- nitrite neg    Hematology  Thrombocytopenia  See  AML    Oncology  Acute blood loss anemia  2/2 AML, IPH, menorrhagia  Hgb 9.7 on admit  -trend CBC  -IVF for hydration    Leukocytosis  See AML    AML (acute myeloblastic leukemia)  Pt diagnosed with AML at OSH today  Acute (2 day) onset of extensive petechial hemorrhaging in BLE and R hand with bruising in R hand and BLE  -WBC >470k, plt 20k on admit  -d/t Moravian reasons, pt declining plt transfusion  -pt given DDAVP for IPH/IVH given low plts  -STAT heme/onc consult  -trend CBC    GI  Transaminitis  Likely 2/2 AML  Avoid hepatotoxic meds  -pt given tylenol for temperature >101F in ED      Other  Blood transfusion declined because patient is Caodaism  Pt declines receiving whole blood products per Moravian beliefs        The patient is being Prophylaxed for:  Venous Thromboembolism with: Mechanical  Stress Ulcer with: Not Applicable   Ventilator Pneumonia with: not applicable    Activity Orders          Turn patient starting at 04/08 2200    Elevate HOB starting at 04/08 2144    Diet NPO: NPO starting at 04/08 2144        DNR     Critical condition in that Patient has a condition that poses threat to life and bodily function: acute spontaneous ICH with IVH, AML, thrombocytopenia with inability to receive blood transfusions d/t Moravian beliefs     55 minutes of Critical care time was spent personally by me on the following activities: development of treatment plan with patient or surrogate and bedside caregivers, discussions with consultants, evaluation of patient's response to treatment, examination of patient, ordering and performing treatments and interventions, ordering and review of laboratory studies, ordering and review of radiographic studies, pulse oximetry, antibiotic titration if applicable, vasopressor titration if applicable, re-evaluation of patient's condition. This critical care time did not overlap with that of any other provider or involve time for any procedures. There is high  probability for acute neurological change leading to clinical and possibly life-threatening deterioration requiring highest level of physician preparedness for urgent intervention.    Claudette Meza PA-C  Neurocritical Care  [facility]

## 2022-04-09 NOTE — ASSESSMENT & PLAN NOTE
Pt diagnosed with AML at OSH today  Acute (2 day) onset of extensive petechial hemorrhaging in BLE and R hand with bruising in R hand and BLE  -WBC >470k, plt 20k on admit  -d/t Pentecostalism reasons, pt declining plt transfusion  -pt given DDAVP for IPH/IVH given low plts  -No intervention given catastrophic presentation

## 2022-04-09 NOTE — CONSULTS
[facility] Critical Care  Vascular Neurology  Comprehensive Stroke Center  Consult Note    Inpatient consult to Vascular (Stroke) Neurology  Consult performed by: Renetta Luis, JOSÉ, NP  Consult ordered by: Claudette Meza PA-C  Reason for consult: ICH        Assessment/Plan:     Cytotoxic cerebral edema  -Moderate area of cytotoxic cerebral edema identified when reviewing brain imaging in the territory of the R middle cerebral artery. There is mass effect associated with it. We will continue to monitor the patients clinical exam with Q4h neuro checks while on the floor, more frequently while in neuro critical care, for any worsening of symptoms which may indicate expansion of the insult and/or area of the edema resulting in clinical change that may require acute intervention to prevent loss of function and/or death. The pattern is suggestive of neoplastic etiology.      Leukemia  -Stroke risk factor.  .30, Platelets 20.  -Patient is a practicing Jehovah's Witnesses and is declining whole blood products at this time.  -Consider Heme/Onc consult    Intraparenchymal hematoma of brain  Lori Hawkins is a 46 y.o. female with no significant medical history who presents to the hospital for evaluation of ICH, leukemia.  The patient was seen at [facility] earlier today for evaluation of multiple areas of bruising and HA for 1 week.  During that evaluation she was noted to have WBC >400,000 and ICH on imaging.  She was diagnosed with acute leukemia and R ICH.  The patient is a practicing Shinto and is declining whole blood products.  Hospice was offered at that facility.  The patient presented to this facility for further options.    -Large ICH w/mass effect on CTH at this facility.  A CTA head and neck were obtained at the OSH and is reported to have no findings of vascular abnormalities or LVO.  -Neurosurgery has been consulted, no surgical intervention at this  time.  -SBP<160            STROKE DOCUMENTATION     Acute Stroke Times   Alteplase Recommended: No  Thrombectomy Recommended: No    NIH Scale:  Interval: baseline  1a. Level of Consciousness: 2-->Not alert, requires repeated stimulation to attend, or is obtunded and requires strong or painful stimulation to make movements (not stereotyped)  1b. LOC Questions: 2-->Answers neither question correctly  1c. LOC Commands: 0-->Performs both tasks correctly  2. Best Gaze: 0-->Normal (R eye ptosis)  3. Visual: 0-->No visual loss  4. Facial Palsy: 0-->Normal symmetrical movements  5a. Motor Arm, Left: 3-->No effort against gravity, limb falls  5b. Motor Arm, Right: 0-->No drift, limb holds 90 (or 45) degrees for full 10 secs  6a. Motor Leg, Left: 3-->No effort against gravity, leg falls to bed immediately  6b. Motor Leg, Right: 0-->No drift, leg holds 30 degree position for full 5 secs  7. Limb Ataxia: 0-->Absent  8. Sensory: 0-->Normal, no sensory loss  9. Best Language: 3-->Mute, global aphasia, no usable speech or auditory comprehension  10. Dysarthria: 2-->Severe dysarthria, patients speech is so slurred as to be unintelligible in the absence of or out of proportion to any dysphasia, or is mute/anarthric  11. Extinction and Inattention (formerly Neglect): 1-->Visual, tactile, auditory, spatial, or personal inattention or extinction to bilateral simultaneous stimulation in one of the sensory modalities  Total (NIH Stroke Scale): 16    Modified [place] Score: 0  [place] Coma Scale:10   ABCD2 Score:    [id number] Score:   HAS -BLED Score:   ICH Score:3  Hunt & Fagan Classification:       Thrombolysis Candidate? No, CT findings (ICH, SAH, Large core infarct)     Delays to Thrombolysis?  Not Applicable    Interventional Revascularization Candidate?   Is the patient eligible for mechanical endovascular reperfusion (JUAN)?  No, ICH    Delays to Thrombectomy? Not Applicable    Hemorrhagic change of an Ischemic Stroke: Does this  patient have an ischemic stroke with hemorrhagic changes? No     Subjective:     History of Present Illness:  Lori Hawkins is a 46 y.o. female with no significant medical history who presents to the hospital for evaluation of ICH, leukemia.  HPI information gathered from review of the patient's medical record, patient's  due to the patient being significantly drowsy during this assessment.  The patient had been c/o a HA since around Monday of this past week.  She also noted bruising to her BLE and had some neck pain.  The patient went to [facility] ED earlier today (4/8/2022) for evaluation and was noted to have a large R ICH.  Neurology, Neurosurgery, Pathology, and Heme/Onc were consulted at that facility.  It was determined the patient has leukemia but she is a practicing Episcopalian and is not amenable to receiving whole blood products.  Because there were no acute treatments without receiving a blood transfusion, hospice was recommended.  The patient left that facility and presented to this facility to see if there are other management options.  Currently the patient is drowsy, arousable with lots of stimulation.  She follows commands, mostly on the right.  Her  states she had just received morphine for her HA and she also had an episode of vomiting.  PERRL.  Breathing even and unlabored.  Multiple areas of bruising noted to the BLE and right hand.  She is admitted to [facility].          History reviewed. No pertinent past medical history.  Past Surgical History:   Procedure Laterality Date    excision lip cyst      benign     Family History   Problem Relation Age of Onset    Breast cancer Neg Hx     Colon cancer Neg Hx     Ovarian cancer Neg Hx      Social History     Tobacco Use    Smoking status: Never Smoker    Smokeless tobacco: Never Used     Review of patient's allergies indicates:   Allergen Reactions    Pcn [penicillins]        Medications: I have reviewed the current medication  administration record.    Medications Prior to Admission   Medication Sig Dispense Refill Last Dose    levonorgestrel-ethinyl estradiol (ENPRESSE) 50-30 (6)/75-40 (5)/125-30(10) per tablet Take 1 tablet by mouth once daily. 84 tablet 4        Review of Systems   Unable to perform ROS: Mental status change   Constitutional:  Positive for fever.   HENT:  Negative for drooling.    Eyes:  Negative for photophobia and redness.   Respiratory:  Negative for cough and shortness of breath.    Gastrointestinal:  Positive for vomiting. Negative for diarrhea.   Genitourinary:  Positive for hematuria (patient currently on menstrual cycle).   Musculoskeletal:  Positive for neck pain.   Skin:  Positive for color change.   Neurological:  Positive for dizziness and headaches.   Psychiatric/Behavioral:  Negative for agitation.    Objective:     Vital Signs (Most Recent):  Temp: 96.8 °F (36 °C) (04/08/22 2234)  Pulse: 82 (04/08/22 2217)  Resp: 16 (04/08/22 2012)  BP: 112/71 (04/08/22 2216)  SpO2: 96 % (04/08/22 2217)    Vital Signs Range (Last 24H):  Temp:  [96.8 °F (36 °C)-101.5 °F (38.6 °C)]   Pulse:  [82-96]   Resp:  [16]   BP: (112-133)/(71-77)   SpO2:  [94 %-97 %]     Physical Exam  Vitals and nursing note reviewed.   Constitutional:       Appearance: She is well-developed, well-groomed and normal weight.   HENT:      Head: Normocephalic and atraumatic.      Right Ear: External ear normal.      Left Ear: External ear normal.      Nose: Nose normal.      Mouth/Throat:      Mouth: Mucous membranes are moist.   Eyes:      General: No scleral icterus.        Right eye: No discharge.         Left eye: No discharge.      Extraocular Movements: Extraocular movements intact.      Conjunctiva/sclera: Conjunctivae normal.      Pupils: Pupils are equal, round, and reactive to light.   Cardiovascular:      Rate and Rhythm: Normal rate and regular rhythm.   Pulmonary:      Effort: Pulmonary effort is normal. No respiratory distress.    Abdominal:      General: Abdomen is flat. There is no distension.      Palpations: Abdomen is soft.   Musculoskeletal:      Cervical back: Normal range of motion and neck supple.      Right lower leg: No edema.      Left lower leg: No edema.   Skin:     General: Skin is warm and dry.      Findings: Bruising present.   Neurological:      Mental Status: She is lethargic.      GCS: GCS eye subscore is 2. GCS verbal subscore is 2. GCS motor subscore is 6.      Motor: No tremor.       Neurological Exam:   LOC: obtunded  Attention Span: Good   Pupils (CN II, III): PERRL      Laboratory:  CMP:   Recent Labs   Lab 04/08/22 2013   CALCIUM 9.6   ALBUMIN 3.9   PROT 6.8      K 4.2   CO2 24      BUN 8   CREATININE 1.0   ALKPHOS 112   *   AST 99*   BILITOT 0.5     CBC:   Recent Labs   Lab 04/08/22 2013   .30*   RBC 3.17*   HGB 9.7*   HCT 30.4*   PLT 20*   MCV 96   MCH 30.6   MCHC 31.9*     Lipid Panel: No results for input(s): CHOL, LDLCALC, HDL, TRIG in the last 168 hours.  Coagulation: No results for input(s): PT, INR, APTT in the last 168 hours.  Hgb A1C: No results for input(s): HGBA1C in the last 168 hours.  TSH: No results for input(s): TSH in the last 168 hours.    Diagnostic Results:      Brain imaging:  [facility] 4/8/2022 Impression: Large acute intraparenchymal hematoma in the right cerebrum at the frontoparietal junction with right lateral intraventricular extension.  Small intraparenchymal hematoma in the anterior right temporal lobe also.  Possible minimal subarachnoid hemorrhage in the basilar cisterns also.  Mild mass effect on the right with mild compression of the right lateral ventricle and mild midline shift to the left.  Neurosurgical consultation and follow-up recommended.      Vessel Imaging:  None    Cardiac Evaluation:   TTE pending        Renetta Luis DNP, NP  [facility] St[facility]  Department of Vascular Neurology   [facility] Neuro Critical [facility]

## 2022-04-09 NOTE — DISCHARGE SUMMARY
"[facility]  Neurocritical Care  Discharge Summary    Admit Date: 4/8/2022    Service Date: 04/09/2022    Discharge Date: 4/9/2022    Length of Stay: 1    Final Active Diagnoses:    Diagnosis Date Noted POA    PRINCIPAL PROBLEM:  Intraparenchymal hematoma of brain [S06.360A] 04/08/2022 Yes    IVH (intraventricular hemorrhage) [I61.5] 04/09/2022 Yes    Blood transfusion declined because patient is Voodoo [Z53.1] 04/09/2022 Not Applicable    Thrombocytopenia [D69.6] 04/09/2022 Yes    Leukocytosis [D72.829] 04/09/2022 Yes    Acute blood loss anemia [D62] 04/09/2022 Yes    Transaminitis [R74.01] 04/09/2022 Yes    Menorrhagia [N92.0] 04/09/2022 Yes    Hypertriglyceridemia [E78.1] 04/09/2022 Yes    Bacteremia [R78.81] 04/09/2022 Yes    Obstructive hydrocephalus [G91.1] 04/09/2022 Yes    Brain herniation [G93.5] 04/09/2022 Yes    ICH (intracerebral hemorrhage) [I61.9]  Yes    Intraventricular hemorrhage [I61.5]  Yes    Spontaneous intracranial hemorrhage [I62.9]  Yes    Cytotoxic cerebral edema [G93.6] 04/08/2022 Yes    AML (acute myeloblastic leukemia) [C92.00] 04/08/2022 Yes      Problems Resolved During this Admission:      History of Present Illness: 46 y.o. female with no significant past medical history presents with spontaneous R parietal ICH with IVH, and AML. The patient was seen at [facility] earlier today for evaluation of multiple areas of bruising and HA for 1 week. Per , pt was in her usual state of health when two days ago (4/6) she noted petechial hemorrhaging in the BLE and R hand with increased bruising in her BLE. She is an otherwise healthy individual, described as somewhat of a "germaphobe" and eats organically. 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 Her only medication is Enpresse. During her evaluation at OSH, she was noted to have WBC >400,000, platelets of 19k. She was diagnosed with AML. While at OSH, pt experience sudden " onset LSW and R gaze. Due to concern for CVA, CTA and MRI were performed without AVM or aneurysm abnormality, with 3.7 x 2.4 x 2.6 cm acute right parietal deep white matter/corona radiata hematoma with no significant associated edema or mass effect. Dependent aspect right lateral ventricle intraventricular hemorrhage.  No hydrocephalus or significant midline shift.  The patient is a practicing Orthodox and declines whole blood products per Zoroastrian beliefs.  Hospice was offered at OSH.  The patient presented to [facility] for exploration of alternative methods for treatment that do not involve transfusions. Interval CTH at [facility] ED showed bleed expansion: Large acute intra parenchymal hematoma in the right frontoparietal cerebrum with associated intraventricular extension of hemorrhage in the right lateral ventricle.  The hematoma measures approximately 6.3 x 7.1 cm on axial 20. There is a small 1.2 cm acute hyperdense hematoma in the anterior right temporal lobe on axial 14 also.  Slight hyperdensity along the sylvian cisterns bilaterally could be associated with mild subarachnoid hemorrhage.  Bilateral hyperdense MCA sign would be less likely. 7 mm midline shift to the left.  No hydrocephalus. Due to low platelet count and inability to receive platelets, no acute neurosurgical intervention was offered. Pt admitted to [facility] for monitoring and medical management of acute spontaneous R parietal ICH/IVH and AML.             Hospital Course by Event: Mrs. Hawkins is a 45 y/o Scientology w/ no significant PMHx presenting after [place] [facility] 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 Patient found to have a spontaneous R parietal IPH w/ IVH, GCS initially 14, however patient rapidly declined overnight, intubated on arrival to NSICU. Repeat imaging w/ significant expansion of bleed and herniation, family continuing to decline platelet  transfusion given Scientologist beliefs, patient not a surgical candidate. No clinical improvement w/ hyperosmolar/hypertonic therapies. Overnight family electing to make patient comfort measures only given catastrophic hemorrhage without chance of meaningful recovery. Patient terminally extubated w/ family at bedside, TOD 1022.      Hospital Course by Problem:   * Intraparenchymal hematoma of brain  Pt went to OSH today for w/u 2 day hx petechial hemorrhage and bruising on BLE and R hand; while there she developed acute LSW and R gaze, GCS still 15    -CTA/MRI performed showing: without AVM or aneurysm abnormality, with 3.7 x 2.4 x 2.6 cm acute right parietal deep white matter/corona radiata hematoma with no significant associated edema or mass effect. Dependent aspect right lateral ventricle intraventricular hemorrhage.  No hydrocephalus or significant midline shift.   -NSGY consult, no acute intervention given plt 20k and inability to transfuse  -Made CMO, terminally extubated    Brain herniation  See IPH    Obstructive hydrocephalus  See IPH    Bacteremia  procal elevated, OSH called  to tell him both blood cultured + GPCs  -vanc begun, repeat blood cultures pending  -UA with reflex pending as well, many bacteria noted- nitrite neg    Hypertriglyceridemia  Noted, holding statin for now given transaminitis    Menorrhagia  Pt menstruating with much heavier flow than usual, likely due to thrombocytopenia/anemia    Transaminitis  Likely 2/2 AML  Avoid hepatotoxic meds  -pt given tylenol for temperature >101F in ED      Acute blood loss anemia  2/2 AML, IPH, menorrhagia  Hgb 9.7 on admit  -trend CBC  -IVF for hydration    Leukocytosis  See AML    Thrombocytopenia  See AML    Blood transfusion declined because patient is Episcopalian  Pt declines receiving whole blood products per Scientologist beliefs    IVH (intraventricular hemorrhage)  See IPH    AML (acute myeloblastic leukemia)  Pt diagnosed with AML at OSH  today  Acute (2 day) onset of extensive petechial hemorrhaging in BLE and R hand with bruising in R hand and BLE  -WBC >470k, plt 20k on admit  -d/t Jewish reasons, pt declining plt transfusion  -pt given DDAVP for IPH/IVH given low plts  -No intervention given catastrophic presentation    Cytotoxic cerebral edema  See IPH          Goals of Care Treatment Preferences:  Code Status: DNR      Significant Results:  Imaging:  CT Head w/o Contrast 2022:  Impression:     1. Large right frontoparietal intraparenchymal hemorrhage with associated intraventricular extension.  Worsened mass effect/edema with effacement of the bilateral sulci and basilar cisterns.  Increased leftward midline shift.  2. Significantly increased volume of intraventricular hemorrhage with dilation of the ventricles concerning for hydrocephalus or trapped ventricle  3. Stable intraparenchymal hemorrhage in the right temporal lobe.    Laboratory:  .7, Hb 9.4, Platelets 17    Pending Results: N/A    Consultations:  IP CONSULT TO NEUROSURGERY  IP CONSULT TO NEURO CRITICAL CARE  IP CONSULT TO VASCULAR (STROKE) NEUROLOGY      Procedures:   None    Medications:   N/A    Diet: N/A    Activity: N/A    Disposition: Discharged     Follow Up Plan:  N/A    This discharge took less than 30 minutes to complete.    Emelyn Rich MD  Neurocritical Care  [facility]

## 2022-04-09 NOTE — NURSING
Patient arrived as a tx from [facility] >> [id number]     Type of stroke/diagnosis:  IPH w/ IVH    Current symptoms: drowsiness, not following commands, RUE and RLE moves spontaneously, LUE extensor posturing, LLE withdraws, L facial droop    Skin assessment done: Yes  Wounds noted: yes; generalized bruising    *If wounds noted, was Wound Care consulted? No open wounds; generalized bruising    Ruiz Completed? FAILED     Patient Belongings on Admit: navy blue [facility] notified: SILVANO Kelly

## 2022-04-09 NOTE — HPI
Lori Hawkins is a 46 y.o. female with no significant medical history who presents to the hospital for evaluation of ICH, leukemia.  HPI information gathered from review of the patient's medical record, patient's  due to the patient being significantly drowsy during this assessment.  The patient had been c/o a HA since around Monday of this past week.  She also noted bruising to her BLE and had some neck pain.  The patient went to [facility] ED earlier today (4/8/2022) for evaluation and was noted to have a large R ICH.  Neurology, Neurosurgery, Pathology, and Heme/Onc were consulted at that facility.  It was determined the patient has leukemia but she is a practicing Nondenominational and is not amenable to receiving whole blood products.  Because there were no acute treatments without receiving a blood transfusion, hospice was recommended.  The patient left that facility and presented to this facility to see if there are other management options.  Currently the patient is drowsy, arousable with lots of stimulation.  She follows commands, mostly on the right.  Her  states she had just received morphine for her HA and she also had an episode of vomiting.  PERRL.  Breathing even and unlabored.  Multiple areas of bruising noted to the BLE and right hand.  She is admitted to [facility].

## 2022-04-09 NOTE — NURSING
MAXIMO stated only following for brain death and ok to terminally extubate patient.  Will call with time of death.

## 2022-04-09 NOTE — ASSESSMENT & PLAN NOTE
-Moderate area of cytotoxic cerebral edema identified when reviewing brain imaging in the territory of the R middle cerebral artery. There is mass effect associated with it. We will continue to monitor the patients clinical exam with Q4h neuro checks while on the floor, more frequently while in neuro critical care, for any worsening of symptoms which may indicate expansion of the insult and/or area of the edema resulting in clinical change that may require acute intervention to prevent loss of function and/or death. The pattern is suggestive of neoplastic etiology.

## 2022-04-09 NOTE — SIGNIFICANT EVENT
Subsequent examinations throughout the night:  Upon arrival to [facility], GCS E1V1M6, snoring respirations -> pt intubated for airway protection  -> GCS E1V1M4-> E1V1M2    --DDAVP 0.3mcg/kg given In ED after CTH showed expansion of bleed from prior measurements from OSH. Per , she had expanded on interval scans at OSH, as well. I explained that her bleed size had essentially doubled from prior and the situation was very fragile. I explained that it appears she is still actively bleeding due to her platelet count being so low. We discussed giving DDAVP and it was given. He asked how we can get her platelet count up showing me medications he had researched. I explained that the best way to raise her platelet count would be to give her platelets, and that ddavp would help activate but not produce more platelets. He asked why her platelets were so low so quickly and I explained that with AML, she began making many new baby or blast WBC which were destroying the platelets in her bone marrow. Unfortunately, her bone marrow cannot keep up with production of platelets in comparison to the destruction which is why they dropped so quickly. He asked what provoked her AML, to which I responded I was unable to answer that question at this time, but that hematology has been consulted and could potentially offer insight into his question.    --Throughout the night, patient continued to decline in examination. Her arousal declined requiring intubation for airway protection, confirmed okay to proceed with  at bedside. I discussed risks and benefits of placing CVC for 23.5% usage with  and declined d/t low platelets. The patients  asked me if she would ever be the same. I explained if she were to survive this event, she likely would have continued debility such as her LSW on arrival and would not be the same person she was before this incident. The patients  voiced understanding wishing to continue  full care.  The patients R pupil became sluggish and subsequently dilated and fixed. She stopped following commands, opening her eyes, and verbally responding. All her extremities began extensor posturing. Her L pupil followed and became fixed, and subsequently dilated and fixed. Her BLE stopped responding to pain at all with continued extensor posturing in BUE. BS reflexes not checked at this time per  request as prior coughing made her appear uncomfortable. Pt still breathing 1-2 breaths over vent.    --In totality, the patient was placed on 2% with two separate boluses, given mannitol 50g, and escalated HTS to 3% with doubled rate all for exam changes without improvement in exam. Q6h Na and serum osmolality were ordered to follow medical interventions.    -- 4 hour interval CTH from previous: Large right frontoparietal intraparenchymal hemorrhage measuring approximately 6.4 x 6.7 cm with associated intraventricular extension.  Significantly increased intraventricular hemorrhage with dilation of the ventricles, left greater than right and concerning for hydrocephalus. Intraventricular hemorrhage is also seen in the 4th ventricle. Overall worsened mass effect/edema with effacement of sulci over the bilateral cerebral convexities and effacement of the basilar cisterns.  The worsening leftward midline shift measuring approximately 13 mm at the level of the septum pellucidum. Stable 1.2 cm intraparenchymal hemorrhage in the right temporal lobe.    --At this time, I went back in to explain the CTH results with the patients  at bedside. I pulled up her CTH and compared it side by side with prior explaining that the bleed had expanded into her ventricles causing obstructive hydrocephalus, which I had discussed initially was a strong possibility for her given that she already had bleeding in her ventricle. I explained that her exam changes were correlating with this increased pressure in her brain, and  that she was showing us she was herniating her brain. I again reiterated the most definitive treatment for her hydrocephalus would be an EVD but that her platelet count had dropped to 17k and it was unsafe to surgically intervene given her bleeding risk. I reiterated the interventions we had done throughout the night and explained that at some point, medical management alone would hit a plateau, which appeared had happened. I advised he call family if anyone wished to see her. He expressed understanding. He agreed her exam had declined and he knew her situation was dire. He stated he didnt want her to suffer and that he was hanging on to hope, but that he understands this is the end of the road for her. He was very appreciative of all we have done for her throughout the night. I explained that we could continue full medical management or we could transition to comfort focused care. He expressed understanding and vocalized wishing to palliatively extubate. After discussing with him what comfort care entails, he endorsed wishing to continue with full care until family arrived a bedside to say their goodbyes and then transitioning to comfort care with palliative extubation when they were ready.  Pts mother, mother-in-law, and care coordinator arrived shortly after and I explained the situation to them and what we had done to intervene. I explained what comfort focused care entails and all family were in agreement they wished to pursue comfort care when a couple more family members arrived. Around 0845AM, all family arrived and patient was transitioned to comfort care.    -- Final examination with propofol paused: no response BLE, extensor posturing BUE, pupils fixed and dilated bilat, breathing 1-2 breaths over vent without dyssynchrony, BS reflexes deferred per  request to avoid discomfort for her.        Critical condition in that Patient has a condition that poses threat to life and bodily function:  worsening acute spontaneous ICH with IVH, obstructive hydrocephalus, MLS of brain, brain herniation, cytotoxic cerebral edema, AML, thrombocytopenia without ability to receive blood products due to Holiness reasons     60 minutes of Critical care time was spent personally by me on the following activities: development of treatment plan with patient or surrogate and bedside caregivers, discussions with consultants, evaluation of patient's response to treatment, examination of patient, ordering and performing treatments and interventions, ordering and review of laboratory studies, ordering and review of radiographic studies, pulse oximetry, antibiotic titration if applicable, vasopressor titration if applicable, re-evaluation of patient's condition. This critical care time did not overlap with that of any other provider or involve time for any procedures. There is high probability for acute neurological change leading to clinical and possibly life-threatening deterioration requiring highest level of physician preparedness for urgent intervention.      Claudette Meza PA-C

## 2022-04-09 NOTE — ASSESSMENT & PLAN NOTE
45 y/o F with iph with ivh, no hydro  Possible AML in blast crisis with platelets of 19     Patient is jehovah whiteness and family is not open to plantlet transfusion. They understand she will likely die from this.      No neurosurgical intervention at this time is warranted.  If she did decline she would not be amenable for neurosurgery unless platelets were greater than 100K     STAT oncologic consult for optimization of platelets and ICU Care.      Elevate HOB. Keep systolic < 160     Rest of care per primary team.

## 2022-04-09 NOTE — HPI
"46 y.o. female with no significant past medical history presents with spontaneous R parietal ICH with IVH, and AML. The patient was seen at [facility] earlier today for evaluation of multiple areas of bruising and HA for 1 week. Per , pt was in her usual state of health when two days ago (4/6) she noted petechial hemorrhaging in the BLE and R hand with increased bruising in her BLE. She is an otherwise healthy individual, described as somewhat of a "germaphobe" and eats organically. 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 Her only medication is Enpresse. During her evaluation at OSH, she was noted to have WBC >400,000, platelets of 19k. She was diagnosed with AML. While at OSH, pt experience sudden onset LSW and R gaze. Due to concern for CVA, CTA and MRI were performed without AVM or aneurysm abnormality, with 3.7 x 2.4 x 2.6 cm acute right parietal deep white matter/corona radiata hematoma with no significant associated edema or mass effect. Dependent aspect right lateral ventricle intraventricular hemorrhage.  No hydrocephalus or significant midline shift.  The patient is a practicing Adventist and declines whole blood products per Mandaen beliefs.  Hospice was offered at OSH.  The patient presented to [facility] for exploration of alternative methods for treatment that do not involve transfusions. Interval CTH at [facility] ED showed bleed expansion: Large acute intra parenchymal hematoma in the right frontoparietal cerebrum with associated intraventricular extension of hemorrhage in the right lateral ventricle.  The hematoma measures approximately 6.3 x 7.1 cm on axial 20. There is a small 1.2 cm acute hyperdense hematoma in the anterior right temporal lobe on axial 14 also.  Slight hyperdensity along the sylvian cisterns bilaterally could be associated with mild subarachnoid hemorrhage.  Bilateral hyperdense MCA sign would be less likely. 7 mm midline shift to the left.  No hydrocephalus. " Due to low platelet count and inability to receive platelets, no acute neurosurgical intervention was offered. Pt admitted to [facility] for monitoring and medical management of acute spontaneous R parietal ICH/IVH and AML.

## 2022-04-09 NOTE — SIGNIFICANT EVENT
Death Note  Hospital Medicine  [facility]            I was called to bedside on 4/9/2022 at 1020. Nursing at beside, nursing supervisor notified. Family at bedside.     Patient is not responding to verbal or tactile stimuli. No heart or breath sounds on auscultation. No respirations. No palpable pulses. Patient does not have a pupillary or corneal reflex. Patient's pupils are fixed and dilated.      Time of death is 4/9/2022  at 1022.      Preliminary cause of Death: nontraumatic intraparenchymal hemorrhage     The family has been informed of the death and condolences given. The  has been notified  for further support.         Signing Physician:    Emelyn Rich MD, MPH  [facility] Contact  Email: [email or web address]  Phone: [phone]

## 2022-04-09 NOTE — ED TRIAGE NOTES
Lori Hawkins, a 46 y.o. female presents to the ED w/ complaint of headache for the past week. Pt took BC powder with minimal relief. Pt was seen at  and came here for evaluation for possible brain bleed. Pt's  reports pt's has multiple bruising throughout body, bleeding gums, and pain in legs.     Triage note:  Chief Complaint   Patient presents with    Cerebrovascular Accident     From . Pt  reports pt had stroke this morning. ,000, platelets [id number]. Unexplained bruising, loss of movement on L side. Possible brain bleed and leukemia dx.      Review of patient's allergies indicates:   Allergen Reactions    Pcn [penicillins]      History reviewed. No pertinent past medical history.

## 2022-04-09 NOTE — NURSING
Provider notified of pt neuro exam change. Also alerted provider of new family at bedside. No new orders at this time. RN WCTM

## 2022-04-09 NOTE — ED PROVIDER NOTES
Encounter Date: 4/8/2022       History     Chief Complaint   Patient presents with    Cerebrovascular Accident     From . Pt  reports pt had stroke this morning. ,000, platelets [id number]. Unexplained bruising, loss of movement on L side. Possible brain bleed and leukemia dx.      46-year-old female with no significant past medical history presents for spontaneous intracranial hemorrhage discovered at  earlier today.  Patient was also found  With a severe leukocytosis, platelets of 19  during their ED visit, and there was concern for new diagnosis of leukemia.  During their hospital stay, patient had a sudden onset of left-sided weakness and was found to intracranial hemorrhage. MRI showed 3.7 x 2.4 x 2.6 cm acute right parietal deep white matter/corona radiata hematoma with no significant associated edema or mass effect. Dependent aspect right lateral ventricle intraventricular hemorrhage. Patient was evaluated by Neurosurgery, Hematology/Oncology, and Neurology at outside facility.  Patient is a Nondenominational and refuses any blood products per Congregation belief. Patient and  would like to explore alternative methods for treatment that do not involve transfusions.        Review of patient's allergies indicates:   Allergen Reactions    Pcn [penicillins]      History reviewed. No pertinent past medical history.  Past Surgical History:   Procedure Laterality Date    excision lip cyst      benign     Family History   Problem Relation Age of Onset    Breast cancer Neg Hx     Colon cancer Neg Hx     Ovarian cancer Neg Hx      Social History     Tobacco Use    Smoking status: Never Smoker    Smokeless tobacco: Never Used     Review of Systems   Constitutional: Negative for chills and fever.   HENT: Negative for rhinorrhea and sore throat.    Eyes: Negative for photophobia and visual disturbance.   Respiratory: Negative for cough, chest tightness and shortness of breath.    Cardiovascular:  Negative for chest pain and palpitations.   Gastrointestinal: Negative for nausea and vomiting.   Genitourinary: Negative for difficulty urinating and dysuria.   Musculoskeletal: Positive for neck pain. Negative for back pain and myalgias.   Skin: Negative for pallor and rash.   Neurological: Positive for facial asymmetry, speech difficulty, weakness and headaches. Negative for dizziness and numbness.   Hematological: Bruises/bleeds easily.   Psychiatric/Behavioral: Negative for agitation and confusion.       Physical Exam     Initial Vitals [04/08/22 1922]   BP Pulse Resp Temp SpO2   121/76 96 16 (!) 100.9 °F (38.3 °C) 96 %      MAP       --         Physical Exam    Nursing note and vitals reviewed.  Constitutional:   Alert, ill-appearing, normal work of breathing, speaking full sentences   HENT:   Head: Normocephalic and atraumatic.   Mouth/Throat: Oropharynx is clear and moist.   Eyes: Conjunctivae are normal. Pupils are equal, round, and reactive to light.   Right gaze preference   Cardiovascular: Normal rate, regular rhythm, normal heart sounds and intact distal pulses.   Pulmonary/Chest: Breath sounds normal. No stridor. No respiratory distress.   Abdominal: Abdomen is soft. She exhibits no distension. There is no abdominal tenderness.   Musculoskeletal:         General: No tenderness or edema.     Neurological: She is alert and oriented to person, place, and time.   LOC: Alert  Attention Span: Normal   Language: No aphasia  Articulation: Mild dysarthria  Orientation: Person, Place, Time   Visual Fields: Intact  EOM (CN III, IV, VI):  Right gaze preference, does not cross midline to the left  Pupils (CN II, III): PERRL  Facial Sensation (CN V): Normal  Facial Movement (CN VII): Symmetric facial expression    Motor: Arm left  1/5  Leg left  1/5  Arm right  5/5  Leg right 5/5  Left-sided neglect, no response to painful stimuli in LUE and LLE     Skin: Skin is warm and dry. No rash noted.   Psychiatric: Thought  content normal.         ED Course   Procedures  Labs Reviewed   CBC W/ AUTO DIFFERENTIAL - Abnormal; Notable for the following components:       Result Value    .30 (*)     RBC 3.17 (*)     Hemoglobin 9.7 (*)     Hematocrit 30.4 (*)     MCHC 31.9 (*)     RDW 15.7 (*)     Platelets 20 (*)     Gran % 0.5 (*)     Lymph % 2.0 (*)     Mono % 0.5 (*)     Platelet Estimate Decreased (*)     All other components within normal limits   COMPREHENSIVE METABOLIC PANEL - Abnormal; Notable for the following components:    AST 99 (*)      (*)     All other components within normal limits   LIPID PANEL   HEMOGLOBIN A1C   TSH   APTT   PROTIME-INR   SARS-COV-2 RDRP GENE   TYPE & SCREEN          Imaging Results          CT Head Without Contrast (In process)                  Medications   acetaminophen tablet 1,000 mg (1,000 mg Oral Not Given 4/8/22 2013)   acetaminophen suppository 650 mg (has no administration in time range)   ondansetron injection 4 mg (has no administration in time range)   sodium chloride 0.9% flush 10 mL (has no administration in time range)   niCARdipine 40 mg/200 mL (0.2 mg/mL) infusion (has no administration in time range)   levETIRAcetam injection 500 mg (has no administration in time range)   morphine injection 4 mg (4 mg Intravenous Given 4/8/22 2012)   levETIRAcetam injection 1,000 mg (1,000 mg Intravenous Given 4/8/22 2012)   levETIRAcetam injection 1,000 mg (1,000 mg Intravenous Given 4/8/22 2146)   ondansetron 4 mg/2 mL injection (4 mg  Given 4/8/22 2145)     Medical Decision Making:   History:   Old Medical Records: I decided to obtain old medical records.  Old Records Summarized: records from clinic visits and records from another hospital.  Initial Assessment:   46-year-old female with no significant past medical history presents for Thrombocytopenia, leukocytosis, and spontaneous intracranial hemorrhage discovered at  earlier today  Clinical Tests:   Lab Tests: Ordered and  Reviewed  Radiological Study: Ordered and Reviewed  ED Management:  Patient arrives via personal vehicle for multiple abnormalities that were found at outside facility including  Leukocytosis ( greater than 400 K), thrombocytopenia with platelet count of 19 K, and mild anemia  Patient has left-sided weakness and neglect  MRI showed 3.7 x 2.4 x 2.6 cm acute right parietal deep white matter/corona radiata hematoma with no significant associated edema or mass effect. 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             ED Course as of 04/08/22 2152 Fri Apr 08, 2022 1949 3.7 x 2.4 x 2.6 cm acute right parietal deep white matter/corona radiata hematoma with no significant associated edema or mass effect. Dependent aspect right lateral ventricle intraventricular hemorrhage [OK]   2007  Discussed with neurosurgery, hematology,  and neurocritical care [OK]   2152  Patient admitted to neurocritical care [OK]      ED Course User Index  [OK] Carlos Alberto Colon MD             Clinical Impression:   Final diagnoses:  [I62.9] Spontaneous intracranial hemorrhage (Primary)  [D69.6] Thrombocytopenia  [D72.829] Leukocytosis, unspecified type  [I61.5] Intraventricular hemorrhage  [Z53.1] Blood transfusion declined because patient is Cheondoism          ED Disposition Condition    Admit               Carlos Alberto Colon MD  Resident  04/08/22 2152

## 2022-04-09 NOTE — ASSESSMENT & PLAN NOTE
Pt went to OSH today for w/u 2 day hx petechial hemorrhage and bruising on BLE and R hand; while there she developed acute LSW and R gaze, GCS still 15    -CTA/MRI performed showing: without AVM or aneurysm abnormality, with 3.7 x 2.4 x 2.6 cm acute right parietal deep white matter/corona radiata hematoma with no significant associated edema or mass effect. Dependent aspect right lateral ventricle intraventricular hemorrhage.  No hydrocephalus or significant midline shift.   -NSGY consult, no acute intervention given plt 20k and inability to transfuse  -I discussed with  the extent of medical management possible such as HTS, mannitol, DDAVP but stated she was very ill and without platelets cannot receive surgical intervention. I stated we can pursue full medical management but I cannot guarantee that it will improve her state and she will likely progress if we cannot slow down her bleeding and subsequent edema from the insult. Pt  wishing to pursue full care, bringing up medications he researched and asking about calling hematology for treatment for platelets in the night time. I explained that ED consulted hematology but that if she were deemed a candidate for chemotherapy, that would not begin until the morning when they come see her. In the meantime, we will give DDAVP and hydrate her. At this time, I believe patient's  is unable to process how ill she is. I will continue to discuss with him throughout he night. He does voice understanding that she has a head bleed and is very sick but is fixated on why her platelets are low and what caused AML.  - Interval CTH at [facility] ED showed bleed expansion: Large acute intra parenchymal hematoma in the right frontoparietal cerebrum with associated intraventricular extension of hemorrhage in the right lateral ventricle.  The hematoma measures approximately 6.3 x 7.1 cm on axial 20. There is a small 1.2 cm acute hyperdense hematoma in the anterior right  temporal lobe on axial 14 also. Slight hyperdensity along the sylvian cisterns bilaterally could be associated with mild subarachnoid hemorrhage. Bilateral hyperdense MCA sign would be less likely. 7 mm midline shift to the left.  No hydrocephalus.   -DDAVP 0.3mcg/kg given  -pt placed on 2% beginning with a 200cc bolus 2%  -q6h Na  -interval CTH pending  -SBP <140, cardene prn but hasn't needed it  -q1h neuro and VS checks  -no AC/AP given bleed and low plts  -PT/OT/SLP as appropriate

## 2022-04-09 NOTE — NURSING
Provider, Claudette SCHAEFER, notified about critical lab values and pt neuro status. RN ordered to give another 2% bolus and switch pt to 3 %.  RN WCTM

## 2022-04-09 NOTE — PROGRESS NOTES
Pharmacokinetic Initial Assessment: IV Vancomycin    Assessment/Plan:    Initiate intravenous vancomycin with loading dose of 1500 mg once followed by a maintenance dose of vancomycin 1500mg IV every 24 hours  Desired empiric serum trough concentration is 15 to 20 mcg/mL  Draw vancomycin trough level 60 min prior to third dose on 4/11 at approximately 0600  Pharmacy will continue to follow and monitor vancomycin.      Please contact pharmacy at extension [phone] with any questions regarding this assessment.     Thank you for the consult,   Anel Golden       Patient brief summary:  Lori Hawkins is a 46 y.o. female initiated on antimicrobial therapy with IV Vancomycin for treatment of suspected bacteremia    Drug Allergies:   Review of patient's allergies indicates:   Allergen Reactions    Pcn [penicillins]        Actual Body Weight:   59kg    Renal Function:   Estimated Creatinine Clearance: 61.3 mL/min (based on SCr of 1 mg/dL).,     Dialysis Method (if applicable):  N/A    CBC (last 72 hours):  Recent Labs   Lab Result Units 04/08/22 2013 04/09/22  0219   WBC K/uL 472.30* 426.70*   Hemoglobin g/dL 9.7* 9.4*   Hematocrit % 30.4* 29.5*   Platelets K/uL 20* 17*   Gran % % 0.5* 1.0*   Lymph % % 2.0* 1.5*   Mono % % 0.5* 0.5*   Eosinophil % % 0.0 0.0   Basophil % % 0.0 0.0   Differential Method  Manual Manual       Metabolic Panel (last 72 hours):  Recent Labs   Lab Result Units 04/08/22 2013 04/09/22  0352   Sodium mmol/L 142  --    Potassium mmol/L 4.2  --    Chloride mmol/L 104  --    CO2 mmol/L 24  --    Glucose mg/dL 105  --    Glucose, UA   --  Negative   BUN mg/dL 8  --    Creatinine mg/dL 1.0  --    Albumin g/dL 3.9  --    Total Bilirubin mg/dL 0.5  --    Alkaline Phosphatase U/L 112  --    AST U/L 99*  --    ALT U/L 116*  --        Drug levels (last 3 results):  No results for input(s): VANCOMYCINRA, VANCOMYCINPE, VANCOMYCINTR in the last 72 hours.    Microbiologic Results:  Microbiology Results  (last 7 days)     Procedure Component Value Units Date/Time    Blood culture [[id number]] Collected: 04/09/22 0424    Order Status: Sent Specimen: Blood from Peripheral, Forearm, Right Updated: 04/09/22 0425    Blood culture [[id number]] Collected: 04/09/22 0410    Order Status: Sent Specimen: Blood from Peripheral, Forearm, Left Updated: 04/09/22 0411

## 2022-04-09 NOTE — ASSESSMENT & PLAN NOTE
Pt went to OSH today for w/u 2 day hx petechial hemorrhage and bruising on BLE and R hand; while there she developed acute LSW and R gaze, GCS still 15    -CTA/MRI performed showing: without AVM or aneurysm abnormality, with 3.7 x 2.4 x 2.6 cm acute right parietal deep white matter/corona radiata hematoma with no significant associated edema or mass effect. Dependent aspect right lateral ventricle intraventricular hemorrhage.  No hydrocephalus or significant midline shift.   -NSGY consult, no acute intervention given plt 20k and inability to transfuse  -Made CMO, terminally extubated

## 2022-04-09 NOTE — ASSESSMENT & PLAN NOTE
Lori Hawkins is a 46 y.o. female with no significant medical history who presents to the hospital for evaluation of ICH, leukemia.  The patient was seen at [facility] earlier today for evaluation of multiple areas of bruising and HA for 1 week.  During that evaluation she was noted to have WBC >400,000 and ICH on imaging.  She was diagnosed with acute leukemia and R ICH.  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  Hospice was offered at that facility.  The patient presented to this facility for further options.    -Large ICH w/mass effect on CTH at this facility.  A CTA head and neck were obtained at the OSH and is reported to have no findings of vascular abnormalities or LVO.  -Neurosurgery has been consulted, no surgical intervention at this time.  -SBP<160

## 2022-04-09 NOTE — NURSING
Family at bedside and ready to proceed with comfort care.   notified.  Morphine started.  MAXIMO notified; awaiting call back.

## 2022-04-09 NOTE — ASSESSMENT & PLAN NOTE
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

## 2022-04-09 NOTE — CONSULTS
[facility] - Emergency Dept  Neurosurgery  Consult Note    Inpatient consult to Neurosurgery  Consult performed by: Jt Cadet MD  Consult ordered by: Carlos Alberto Colon MD        Subjective:     Chief Complaint/Reason for Admission: Iph with ivh    History of Present Illness: 46-year-old female with no significant past medical history presents for spontaneous intracranial hemorrhage discovered at  earlier today.  Patient was also found  with a severe leukocytosis, platelets of 19  during their ED visit, and there was concern for new diagnosis of leukemia.  During their hospital stay, patient had a sudden onset of left-sided weakness and was found to intracranial hemorrhage. MRI showed 3.7 x 2.4 x 2.6 cm acute right parietal deep white matter/corona radiata hematoma with no significant associated edema or mass effect. Dependent aspect right lateral ventricle intraventricular hemorrhage.  No hydrocephalus or significant midline shift. Patient is a Roman Catholic and refuses any blood products per Anglican belief. Patient and  would like to explore alternative methods for treatment that do not involve transfusions.         (Not in a hospital admission)      Review of patient's allergies indicates:   Allergen Reactions    Pcn [penicillins]        History reviewed. No pertinent past medical history.  Past Surgical History:   Procedure Laterality Date    excision lip cyst      benign     Family History    None       Tobacco Use    Smoking status: Never Smoker    Smokeless tobacco: Never Used   Substance and Sexual Activity    Alcohol use: Not on file    Drug use: Not on file    Sexual activity: Not on file     Review of Systems  Objective:     Weight: 59 kg (130 lb)  Body mass index is 22.31 kg/m².  Vital Signs (Most Recent):  Temp: (!) 100.9 °F (38.3 °C) (04/08/22 1922)  Pulse: 96 (04/08/22 2012)  Resp: 16 (04/08/22 2012)  BP: 125/74 (04/08/22 2002)  SpO2: 97 % (04/08/22 2012)   Vital Signs (24h  Range):  Temp:  [100.9 °F (38.3 °C)] 100.9 °F (38.3 °C)  Pulse:  [92-96] 96  Resp:  [16] 16  SpO2:  [96 %-97 %] 97 %  BP: (121-133)/(72-76) 125/74                   Physical Exam  Neurosurgery Physical Exam    Left alfredo plegic   follows on right. Aox2   PERRL    right gaze deviation.  Sees two fingers in left quadrant.       Significant Labs:  Recent Labs   Lab 04/08/22 2013         K 4.2      CO2 24   BUN 8   CREATININE 1.0   CALCIUM 9.6     Recent Labs   Lab 04/08/22 2013   .30*   HGB 9.7*   HCT 30.4*   PLT 20*     No results for input(s): LABPT, INR, APTT in the last 48 hours.  Microbiology Results (last 7 days)       ** No results found for the last 168 hours. **          All pertinent labs from the last 24 hours have been reviewed.    Significant Diagnostics:  CT: No results found in the last 24 hours.  CTH with IPH near motor strip and IVH but no hydro.  No significant MLS      Assessment/Plan:     Intraparenchymal hematoma of brain  47 y/o F with iph with ivh, no hydro  Possible AML in blast crisis with platelets of 19     Patient is jehovah whiteness and family is not open to plantlet transfusion. They understand she will likely die from this.      No neurosurgical intervention at this time is warranted.  If she did decline she would not be amenable for neurosurgery unless platelets were greater than 100K     STAT oncologic consult for optimization of platelets and ICU Care.      Elevate HOB. Keep systolic < 160     Rest of care per primary team.       Discussed with Dr. Chad Cadet MD  Neurosurgery  [facility] - Emergency Dept

## 2022-04-09 NOTE — PT/OT/SLP EVAL
"Occupational Therapy   Evaluation/discharge    Name: Lori Hawkins  MRN: [id number]  Admitting Diagnosis:  Intraparenchymal hematoma of brain  Recent Surgery: * No surgery found *      Recommendations:     Discharge Recommendations:  (pending extubation)  Discharge Equipment Recommendations:   (pending extubation)  Barriers to discharge:  None    Assessment:     Lori Hawkins is a 46 y.o. female with a medical diagnosis of Intraparenchymal hematoma of brain.  She presents with performance deficits affecting function: weakness, impaired sensation, impaired self care skills, impaired functional mobilty, decreased coordination, impaired cognition, visual deficits, impaired balance, gait instability, decreased upper extremity function, decreased lower extremity function, decreased safety awareness, decreased ROM, impaired coordination, impaired fine motor, impaired cardiopulmonary response to activity.      Rehab Prognosis: Fair; patient would benefit from acute skilled OT services to address these deficits and reach maximum level of function.       Plan:     · Discharge from OT services on acute; planning to transition to comfort care  · Plan of Care Reviewed with: patient    Subjective     Patient:  Nonverbal; intubated  :  "We walked in the clinic and they sent us here and within an hour her face drooped and she lost movement on her right side."  "Monday her left leg was sore; Tuesday she had unexplained bruising on both legs; Wednesday her gums started to bleed and she felt feverish and was very tired."   Occupational Profile:  Patient resides in [place] with her  in 1-1/2 [place] home with 3 steps to enter, no rail.  Patient is right handed.  PTA patient independent with ADLs including driving.  Works: customer service.  Hobbies: travel.  Currently owns no DME.    Pain/Comfort:  · Pain Rating 1: 0/10  · Pain Rating Post-Intervention 1: 0/10    Patients cultural, spiritual, Temple " conflicts given the current situation: no    Objective:     Communicated with: Nurse prior to session.  Patient found supine with peripheral IV, ventilator, restraints, dewey catheter, telemetry, SCD, pulse ox (continuous), bed alarm, blood pressure cuff upon OT entry to room.    General Precautions: Standard, aspiration, fall, NPO   Orthopedic Precautions:N/A   Braces: N/A  Respiratory Status: ventilator    Occupational Performance:    Bed Mobility:    · Patient completed Rolling/Turning to Left with  total assistance  · Patient completed Rolling/Turning to Right with total assistance    Functional Mobility/Transfers:  · Dependent drawsheet     Activities of Daily Living:  · Feeding:  NPO    · Grooming: total assistance while supine    Cognitive/Visual Perceptual:  Cognitive/Psychosocial Skills:     -       Oriented to: Daily orientation provided   -       Follows Commands/attention:Unable to follow commands   -       Communication: nonverbal    Physical Exam:  Postural examination/scapula alignment:    -       Rounded shoulders  Skin integrity: Bruising of bilateral LEs, right hand  Edema:  None noted  Upper Extremity Range of Motion:     -       Right Upper Extremity: PROM WNL  -       Left Upper Extremity: PROM WNL    AMPAC 6 Click ADL:  AMPAC Total Score: 6    Treatment & Education:  Daily orientation provided.   PROM performed bilateral UE/LEs one set x 5 rep in all planes of motion with stretches provided at end range; assistance and facilitation provided for upward rotation of the scapula during shoulder flexion and abduction to promote orientation of glenoid fossa of scapula to humeral head for prevention of post-stroke hemiplegic pain.  Patient kept eyes closed 100% of the session.  Patient unable to follow commands.    Education:    Patient left supine with all lines intact, call button in reach and bed alarm on    GOALS:   Multidisciplinary Problems     Occupational Therapy Goals        Problem:  Occupational Therapy    Goal Priority Disciplines Outcome Interventions   Occupational Therapy Goal     OT, PT/OT Ongoing, Progressing    Description: Goals set 4/9 to be addressed for 14 days with expiration date, 4/23:  Patient will increase functional independence with ADLs by performing:    Patient will demonstrate rolling to the right with max assist.  Not met   Patient will demonstrate rolling to the left with max assist.   Not met  Patient will demonstrate supine -sit with max  assist.   Not met  Patient will demonstrate stand pivot transfers with max assist.   Not met  Patient will demonstrate grooming while seated with max assist.   Not met  Patient will demonstrate upper body dressing with max assist while seated EOB.   Not met  Patient will demonstrate lower body dressing with max assist while seated EOB.   Not met  Patient will demonstrate toileting with max assist.   Not met  Patient will demonstrate bathing while seated EOB with max assist.   Not met  Patient will demonstrate ability to follow 3/5 commands.   Not met  Patient's family / caregiver will demonstrate independence and safety with assisting patient with self-care skills and functional mobility.     Not met  Patient's family / caregiver will demonstrate independence with providing ROM and changes in bed positioning.   Not met                           History:     History reviewed. No pertinent past medical history.    Past Surgical History:   Procedure Laterality Date    excision lip cyst      benign       Time Tracking:     OT Date of Treatment: 04/09/22  OT Start Time: 0400  OT Stop Time: 0420  OT Total Time (min): 20 min    Billable Minutes:Evaluation 12  Neuromuscular Re-education 8    4/9/2022

## 2022-04-09 NOTE — ASSESSMENT & PLAN NOTE
-Stroke risk factor.  .30, Platelets 20.  -Patient is a practicing Jehovah's Witnesses and is declining whole blood products at this time.  -Consider Heme/Onc consult

## 2022-04-09 NOTE — ASSESSMENT & PLAN NOTE
procal elevated, OSH called  to tell him both blood cultured + GPCs  -vanc begun, repeat blood cultures pending  -UA with reflex pending as well, many bacteria noted- nitrite neg

## 2022-04-11 LAB
EST. AVERAGE GLUCOSE BLD GHB EST-MCNC: NORMAL MG/DL
HBA1C MFR BLD: NORMAL %
PATH REV BLD -IMP: NORMAL

## 2022-04-14 LAB
BACTERIA BLD CULT: NORMAL
BACTERIA BLD CULT: NORMAL

## 2022-06-03 NOTE — ASSESSMENT & PLAN NOTE
Pt menstruating with much heavier flow than usual, likely due to thrombocytopenia/anemia   No restrictions

## 2023-02-24 NOTE — HOSPITAL COURSE
Mrs. Hawkins is a 45 y/o Christian w/ no significant PMHx presenting after leaving [place] from 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 Patient found to have a spontaneous R parietal IPH w/ IVH, GCS initially 14, however patient rapidly declined overnight, intubated on arrival to NSICU. Repeat imaging w/ significant expansion of bleed and herniation, family continuing to decline platelet transfusion given [employer] orthodox beliefs, patient not a surgical candidate. No clinical improvement w/ hyperosmolar/hypertonic therapies. Overnight family electing to make patient comfort measures only given catastrophic hemorrhage without chance of meaningful recovery. Patient terminally extubated w/ family at bedside, TOD 1022.  
No